# Patient Record
Sex: FEMALE | Race: WHITE | NOT HISPANIC OR LATINO | Employment: OTHER | URBAN - METROPOLITAN AREA
[De-identification: names, ages, dates, MRNs, and addresses within clinical notes are randomized per-mention and may not be internally consistent; named-entity substitution may affect disease eponyms.]

---

## 2018-03-19 ENCOUNTER — THERAPY VISIT (OUTPATIENT)
Dept: PHYSICAL THERAPY | Facility: CLINIC | Age: 68
End: 2018-03-19
Payer: COMMERCIAL

## 2018-03-19 DIAGNOSIS — M25.572 PAIN IN JOINT, ANKLE AND FOOT, LEFT: Primary | ICD-10-CM

## 2018-03-19 PROCEDURE — 97110 THERAPEUTIC EXERCISES: CPT | Mod: GP | Performed by: PHYSICAL THERAPIST

## 2018-03-19 PROCEDURE — 97161 PT EVAL LOW COMPLEX 20 MIN: CPT | Mod: GP | Performed by: PHYSICAL THERAPIST

## 2018-03-19 NOTE — PROGRESS NOTES
[facility] Initial Evaluation  Subjective:  Patient is a 67 year old female presenting with rehab left ankle/foot hpi. The history is provided by the patient. No  was used.   Brenda Christopher is a 67 year old female with a left ankle condition.  Condition occurred with:  A fall/slip.  Condition occurred: in the community.  This is a new condition  Onset of L ankle pain when she slipped on the ice and landed on her L foot in Jan. Was put in a walking boot for a little bit and then was given an ankle brace instead. Sustained fibula fracture and ligament sprain..    Patient reports pain:  Lateral.  Radiates to:  No radiation.  Pain is described as sharp (pronounced pain) and is intermittent and reported as 2/10 and 8/10.  Associated symptoms:  Loss of motion/stiffness, loss of strength and edema. Pain is the same all the time.  Symptoms are exacerbated by ascending stairs, descending stairs, standing and walking (turning over in bed) and relieved by rest and analgesics (using ankle brace, tramadol PRN).  Since onset symptoms are gradually improving.  Special tests:  X-ray (fracture per patient report).      General health as reported by patient is good.  Pertinent medical history includes:  Overweight, diabetes, high blood pressure and sleep disorder/apnea.  Medical allergies: no.  Surgical history: PF release.  Current medications:  High blood pressure medication (metformin, lisinopril).  Current occupation is Retired.        Barriers include:  None as reported by patient.    Red flags:  None as reported by patient.                        Objective:  System    Ankle/Foot Evaluation  ROM:    AROM:    Dorsiflexion: Left:   10  Right:    Plantarflexion: Left:  60    Right:   Inversion: Left:  28++     Right:   Eversion: 38- pain     Right:         Strength:    Dorsiflexion:  Left: 5-/5     Pain:     Plantarflexion: Left: 5-/5    Pain:++     Inversion:Left: 5-/5   Pain:-/+        Eversion:Left: 4+/5   Pain:++                    LIGAMENT TESTING:       Varus Stress (Calc Fib) Left: pos              PALPATION:   Left ankle tenderness present at:  anterior talofibular ligament; posterior talofibular ligament; calcaneofibular ligament and lateral malleolus  Left ankle tenderness not present at:   medial malleolus    EDEMA: Edema ankle: mild general swelling.                                                                  General Evaluation:                              Gait:    Significant findings:  Ambulates with morderately antalgic gait pattern.                                     ROS    Assessment/Plan:    Patient is a 67 year old female with left side ankle complaints.    Patient has the following significant findings with corresponding treatment plan.                Diagnosis 1:  S/P L ankle fracture and sprain  Pain -  hot/cold therapy, manual therapy, self management, education and home program  Decreased ROM/flexibility - manual therapy, therapeutic exercise, therapeutic activity and home program  Decreased strength - therapeutic exercise, therapeutic activities and home program  Impaired balance - neuro re-education, therapeutic activities and home program  Decreased proprioception - neuro re-education, therapeutic activities and home program  Impaired gait - gait training and home program  Impaired muscle performance - neuro re-education and home program  Decreased function - therapeutic activities and home program    Therapy Evaluation Codes:   1) History comprised of:   Personal factors that impact the plan of care:      Past/current experiences.    Comorbidity factors that impact the plan of care are:      Diabetes, High blood pressure, Pain at night/rest and Sleep disorder/apnea.     Medications impacting care: High blood pressure and DM meds.  2) Examination of Body Systems comprised of:   Body structures and functions that impact the plan of care:      Ankle.   Activity  limitations that impact the plan of care are:      Driving, Stairs, Standing, Walking and Sleeping.  3) Clinical presentation characteristics are:   Stable/Uncomplicated.  4) Decision-Making    Low complexity using standardized patient assessment instrument and/or measureable assessment of functional outcome.  Cumulative Therapy Evaluation is: Low complexity.    Previous and current functional limitations:  (See Goal Flow Sheet for this information)    Short term and Long term goals: (See Goal Flow Sheet for this information)     Communication ability:  Patient appears to be able to clearly communicate and understand verbal and written communication and follow directions correctly.  Treatment Explanation - The following has been discussed with the patient:   RX ordered/plan of care  Anticipated outcomes  Possible risks and side effects  This patient would benefit from PT intervention to resume normal activities.   Rehab potential is good.    Frequency:  1 X week, once daily  Duration:  for 6-8 weeks  Discharge Plan:  Achieve all LTG.  Independent in home treatment program.  Reach maximal therapeutic benefit.    Please refer to the daily flowsheet for treatment today, total treatment time and time spent performing 1:1 timed codes.

## 2018-06-05 ENCOUNTER — HOSPITAL ENCOUNTER (OUTPATIENT)
Dept: MAMMOGRAPHY | Facility: CLINIC | Age: 68
Discharge: HOME | End: 2018-06-05

## 2018-06-05 DIAGNOSIS — Z12.31 VISIT FOR SCREENING MAMMOGRAM: ICD-10-CM

## 2018-06-25 PROBLEM — M25.572 PAIN IN JOINT, ANKLE AND FOOT, LEFT: Status: RESOLVED | Noted: 2018-03-19 | Resolved: 2018-06-25

## 2019-10-04 ENCOUNTER — HOSPITAL ENCOUNTER (OUTPATIENT)
Dept: MAMMOGRAPHY | Facility: CLINIC | Age: 69
Discharge: HOME | End: 2019-10-04

## 2019-10-04 DIAGNOSIS — Z12.31 VISIT FOR SCREENING MAMMOGRAM: ICD-10-CM

## 2021-05-24 ENCOUNTER — RECORDS - HEALTHEAST (OUTPATIENT)
Dept: ADMINISTRATIVE | Facility: CLINIC | Age: 71
End: 2021-05-24

## 2021-05-25 ENCOUNTER — RECORDS - HEALTHEAST (OUTPATIENT)
Dept: ADMINISTRATIVE | Facility: CLINIC | Age: 71
End: 2021-05-25

## 2021-05-26 ENCOUNTER — RECORDS - HEALTHEAST (OUTPATIENT)
Dept: ADMINISTRATIVE | Facility: CLINIC | Age: 71
End: 2021-05-26

## 2021-05-27 ENCOUNTER — RECORDS - HEALTHEAST (OUTPATIENT)
Dept: ADMINISTRATIVE | Facility: CLINIC | Age: 71
End: 2021-05-27

## 2021-05-28 ENCOUNTER — RECORDS - HEALTHEAST (OUTPATIENT)
Dept: ADMINISTRATIVE | Facility: CLINIC | Age: 71
End: 2021-05-28

## 2021-05-29 ENCOUNTER — RECORDS - HEALTHEAST (OUTPATIENT)
Dept: ADMINISTRATIVE | Facility: CLINIC | Age: 71
End: 2021-05-29

## 2021-05-30 ENCOUNTER — RECORDS - HEALTHEAST (OUTPATIENT)
Dept: ADMINISTRATIVE | Facility: CLINIC | Age: 71
End: 2021-05-30

## 2021-05-31 ENCOUNTER — RECORDS - HEALTHEAST (OUTPATIENT)
Dept: ADMINISTRATIVE | Facility: CLINIC | Age: 71
End: 2021-05-31

## 2021-06-19 ENCOUNTER — HEALTH MAINTENANCE LETTER (OUTPATIENT)
Age: 71
End: 2021-06-19

## 2021-07-13 ENCOUNTER — RECORDS - HEALTHEAST (OUTPATIENT)
Dept: ADMINISTRATIVE | Facility: CLINIC | Age: 71
End: 2021-07-13

## 2021-07-21 ENCOUNTER — RECORDS - HEALTHEAST (OUTPATIENT)
Dept: ADMINISTRATIVE | Facility: CLINIC | Age: 71
End: 2021-07-21

## 2021-10-09 ENCOUNTER — HEALTH MAINTENANCE LETTER (OUTPATIENT)
Age: 71
End: 2021-10-09

## 2021-10-15 ENCOUNTER — HOSPITAL ENCOUNTER (INPATIENT)
Facility: HOSPITAL | Age: 71
LOS: 2 days | Discharge: HOME | DRG: 391 | End: 2021-10-18
Attending: EMERGENCY MEDICINE | Admitting: STUDENT IN AN ORGANIZED HEALTH CARE EDUCATION/TRAINING PROGRAM
Payer: MEDICARE

## 2021-10-15 ENCOUNTER — APPOINTMENT (OUTPATIENT)
Dept: RADIOLOGY | Facility: HOSPITAL | Age: 71
DRG: 391 | End: 2021-10-15
Attending: EMERGENCY MEDICINE
Payer: MEDICARE

## 2021-10-15 ENCOUNTER — APPOINTMENT (OUTPATIENT)
Dept: CT IMAGING | Facility: HOSPITAL | Age: 71
DRG: 391 | End: 2021-10-15
Attending: EMERGENCY MEDICINE
Payer: MEDICARE

## 2021-10-15 DIAGNOSIS — Z79.4 TYPE 2 DIABETES MELLITUS WITH DIABETIC NEUROPATHY, WITH LONG-TERM CURRENT USE OF INSULIN (H): ICD-10-CM

## 2021-10-15 DIAGNOSIS — R79.89 ELEVATED TROPONIN: Primary | ICD-10-CM

## 2021-10-15 DIAGNOSIS — I21.3 ST ELEVATION MI (STEMI) (H): ICD-10-CM

## 2021-10-15 DIAGNOSIS — I24.9 ACS (ACUTE CORONARY SYNDROME) (H): ICD-10-CM

## 2021-10-15 DIAGNOSIS — I10 ESSENTIAL HYPERTENSION: ICD-10-CM

## 2021-10-15 DIAGNOSIS — I21.4 NSTEMI (NON-ST ELEVATED MYOCARDIAL INFARCTION) (H): ICD-10-CM

## 2021-10-15 DIAGNOSIS — R91.1 PULMONARY NODULE: ICD-10-CM

## 2021-10-15 DIAGNOSIS — E11.40 TYPE 2 DIABETES MELLITUS WITH DIABETIC NEUROPATHY, WITH LONG-TERM CURRENT USE OF INSULIN (H): ICD-10-CM

## 2021-10-15 DIAGNOSIS — K44.9 HIATAL HERNIA: ICD-10-CM

## 2021-10-15 PROBLEM — R10.10 UPPER ABDOMINAL PAIN: Status: ACTIVE | Noted: 2021-10-15

## 2021-10-15 LAB
ALBUMIN SERPL-MCNC: 3.8 G/DL (ref 3.5–5)
ALP SERPL-CCNC: 70 U/L (ref 45–120)
ALT SERPL W P-5'-P-CCNC: 20 U/L (ref 0–45)
ANION GAP SERPL CALCULATED.3IONS-SCNC: 12 MMOL/L (ref 5–18)
AST SERPL W P-5'-P-CCNC: 18 U/L (ref 0–40)
BASOPHILS # BLD AUTO: 0 10E3/UL (ref 0–0.2)
BASOPHILS NFR BLD AUTO: 1 %
BILIRUB DIRECT SERPL-MCNC: 0.1 MG/DL
BILIRUB SERPL-MCNC: 0.4 MG/DL (ref 0–1)
BUN SERPL-MCNC: 13 MG/DL (ref 8–28)
CALCIUM SERPL-MCNC: 9.7 MG/DL (ref 8.5–10.5)
CHLORIDE BLD-SCNC: 102 MMOL/L (ref 98–107)
CO2 SERPL-SCNC: 25 MMOL/L (ref 22–31)
CREAT SERPL-MCNC: 0.79 MG/DL (ref 0.6–1.1)
EOSINOPHIL # BLD AUTO: 0.1 10E3/UL (ref 0–0.7)
EOSINOPHIL NFR BLD AUTO: 1 %
ERYTHROCYTE [DISTWIDTH] IN BLOOD BY AUTOMATED COUNT: 14.1 % (ref 10–15)
ERYTHROCYTE [DISTWIDTH] IN BLOOD BY AUTOMATED COUNT: 14.1 % (ref 10–15)
GFR SERPL CREATININE-BSD FRML MDRD: 76 ML/MIN/1.73M2
GLUCOSE BLD-MCNC: 162 MG/DL (ref 70–125)
HCT VFR BLD AUTO: 34.8 % (ref 35–47)
HCT VFR BLD AUTO: 38.6 % (ref 35–47)
HGB BLD-MCNC: 11.5 G/DL (ref 11.7–15.7)
HGB BLD-MCNC: 12.4 G/DL (ref 11.7–15.7)
HOLD SPECIMEN: NORMAL
IMM GRANULOCYTES # BLD: 0 10E3/UL
IMM GRANULOCYTES NFR BLD: 1 %
LIPASE SERPL-CCNC: 42 U/L (ref 0–52)
LYMPHOCYTES # BLD AUTO: 1.7 10E3/UL (ref 0.8–5.3)
LYMPHOCYTES NFR BLD AUTO: 20 %
MAGNESIUM SERPL-MCNC: 1.5 MG/DL (ref 1.8–2.6)
MCH RBC QN AUTO: 28.7 PG (ref 26.5–33)
MCH RBC QN AUTO: 29.1 PG (ref 26.5–33)
MCHC RBC AUTO-ENTMCNC: 32.1 G/DL (ref 31.5–36.5)
MCHC RBC AUTO-ENTMCNC: 33 G/DL (ref 31.5–36.5)
MCV RBC AUTO: 88 FL (ref 78–100)
MCV RBC AUTO: 89 FL (ref 78–100)
MONOCYTES # BLD AUTO: 0.3 10E3/UL (ref 0–1.3)
MONOCYTES NFR BLD AUTO: 4 %
NEUTROPHILS # BLD AUTO: 6.6 10E3/UL (ref 1.6–8.3)
NEUTROPHILS NFR BLD AUTO: 73 %
NRBC # BLD AUTO: 0 10E3/UL
NRBC BLD AUTO-RTO: 0 /100
PLATELET # BLD AUTO: 300 10E3/UL (ref 150–450)
PLATELET # BLD AUTO: 329 10E3/UL (ref 150–450)
POTASSIUM BLD-SCNC: 4.5 MMOL/L (ref 3.5–5)
PROT SERPL-MCNC: 7.3 G/DL (ref 6–8)
RBC # BLD AUTO: 3.95 10E6/UL (ref 3.8–5.2)
RBC # BLD AUTO: 4.32 10E6/UL (ref 3.8–5.2)
SODIUM SERPL-SCNC: 139 MMOL/L (ref 136–145)
TROPONIN I SERPL-MCNC: 0.09 NG/ML (ref 0–0.29)
TROPONIN I SERPL-MCNC: 0.28 NG/ML (ref 0–0.29)
TROPONIN I SERPL-MCNC: 0.34 NG/ML (ref 0–0.29)
WBC # BLD AUTO: 8.8 10E3/UL (ref 4–11)
WBC # BLD AUTO: 9 10E3/UL (ref 4–11)

## 2021-10-15 PROCEDURE — 93005 ELECTROCARDIOGRAM TRACING: CPT | Performed by: STUDENT IN AN ORGANIZED HEALTH CARE EDUCATION/TRAINING PROGRAM

## 2021-10-15 PROCEDURE — 80053 COMPREHEN METABOLIC PANEL: CPT | Performed by: EMERGENCY MEDICINE

## 2021-10-15 PROCEDURE — 96365 THER/PROPH/DIAG IV INF INIT: CPT | Mod: 59

## 2021-10-15 PROCEDURE — 250N000009 HC RX 250: Performed by: EMERGENCY MEDICINE

## 2021-10-15 PROCEDURE — G0378 HOSPITAL OBSERVATION PER HR: HCPCS

## 2021-10-15 PROCEDURE — 36415 COLL VENOUS BLD VENIPUNCTURE: CPT | Performed by: STUDENT IN AN ORGANIZED HEALTH CARE EDUCATION/TRAINING PROGRAM

## 2021-10-15 PROCEDURE — 83690 ASSAY OF LIPASE: CPT | Performed by: STUDENT IN AN ORGANIZED HEALTH CARE EDUCATION/TRAINING PROGRAM

## 2021-10-15 PROCEDURE — 99219 PR INITIAL OBSERVATION CARE,LEVEL II: CPT | Performed by: STUDENT IN AN ORGANIZED HEALTH CARE EDUCATION/TRAINING PROGRAM

## 2021-10-15 PROCEDURE — 93005 ELECTROCARDIOGRAM TRACING: CPT | Performed by: EMERGENCY MEDICINE

## 2021-10-15 PROCEDURE — 250N000011 HC RX IP 250 OP 636: Performed by: EMERGENCY MEDICINE

## 2021-10-15 PROCEDURE — 71046 X-RAY EXAM CHEST 2 VIEWS: CPT

## 2021-10-15 PROCEDURE — 84484 ASSAY OF TROPONIN QUANT: CPT | Performed by: EMERGENCY MEDICINE

## 2021-10-15 PROCEDURE — 83690 ASSAY OF LIPASE: CPT | Performed by: EMERGENCY MEDICINE

## 2021-10-15 PROCEDURE — 85027 COMPLETE CBC AUTOMATED: CPT | Performed by: EMERGENCY MEDICINE

## 2021-10-15 PROCEDURE — 36415 COLL VENOUS BLD VENIPUNCTURE: CPT | Performed by: EMERGENCY MEDICINE

## 2021-10-15 PROCEDURE — C9803 HOPD COVID-19 SPEC COLLECT: HCPCS

## 2021-10-15 PROCEDURE — 85025 COMPLETE CBC W/AUTO DIFF WBC: CPT | Performed by: EMERGENCY MEDICINE

## 2021-10-15 PROCEDURE — 83735 ASSAY OF MAGNESIUM: CPT | Performed by: STUDENT IN AN ORGANIZED HEALTH CARE EDUCATION/TRAINING PROGRAM

## 2021-10-15 PROCEDURE — 99285 EMERGENCY DEPT VISIT HI MDM: CPT | Mod: 25

## 2021-10-15 PROCEDURE — 84484 ASSAY OF TROPONIN QUANT: CPT | Performed by: STUDENT IN AN ORGANIZED HEALTH CARE EDUCATION/TRAINING PROGRAM

## 2021-10-15 PROCEDURE — 85025 COMPLETE CBC W/AUTO DIFF WBC: CPT | Performed by: STUDENT IN AN ORGANIZED HEALTH CARE EDUCATION/TRAINING PROGRAM

## 2021-10-15 PROCEDURE — 83036 HEMOGLOBIN GLYCOSYLATED A1C: CPT | Performed by: STUDENT IN AN ORGANIZED HEALTH CARE EDUCATION/TRAINING PROGRAM

## 2021-10-15 PROCEDURE — 71260 CT THORAX DX C+: CPT

## 2021-10-15 PROCEDURE — 250N000013 HC RX MED GY IP 250 OP 250 PS 637: Performed by: EMERGENCY MEDICINE

## 2021-10-15 PROCEDURE — 96366 THER/PROPH/DIAG IV INF ADDON: CPT

## 2021-10-15 PROCEDURE — 80053 COMPREHEN METABOLIC PANEL: CPT | Performed by: STUDENT IN AN ORGANIZED HEALTH CARE EDUCATION/TRAINING PROGRAM

## 2021-10-15 RX ORDER — HEPARIN SODIUM 10000 [USP'U]/100ML
0-5000 INJECTION, SOLUTION INTRAVENOUS CONTINUOUS
Status: DISCONTINUED | OUTPATIENT
Start: 2021-10-15 | End: 2021-10-17

## 2021-10-15 RX ORDER — GLIPIZIDE 10 MG/1
20 TABLET, FILM COATED, EXTENDED RELEASE ORAL DAILY
COMMUNITY

## 2021-10-15 RX ORDER — CETIRIZINE HYDROCHLORIDE 10 MG/1
10 TABLET ORAL DAILY
COMMUNITY

## 2021-10-15 RX ORDER — SERTRALINE HYDROCHLORIDE 100 MG/1
200 TABLET, FILM COATED ORAL DAILY
COMMUNITY

## 2021-10-15 RX ORDER — IOPAMIDOL 755 MG/ML
100 INJECTION, SOLUTION INTRAVASCULAR ONCE
Status: COMPLETED | OUTPATIENT
Start: 2021-10-15 | End: 2021-10-15

## 2021-10-15 RX ORDER — ONDANSETRON 4 MG/1
4 TABLET, ORALLY DISINTEGRATING ORAL ONCE
Status: COMPLETED | OUTPATIENT
Start: 2021-10-15 | End: 2021-10-15

## 2021-10-15 RX ORDER — CYCLOBENZAPRINE HCL 10 MG
10 TABLET ORAL 3 TIMES DAILY PRN
COMMUNITY

## 2021-10-15 RX ORDER — CLONAZEPAM 1 MG/1
1 TABLET ORAL 2 TIMES DAILY PRN
COMMUNITY

## 2021-10-15 RX ORDER — BUSPIRONE HYDROCHLORIDE 7.5 MG/1
7.5 TABLET ORAL 2 TIMES DAILY
COMMUNITY

## 2021-10-15 RX ORDER — FAMOTIDINE 20 MG/1
20 TABLET, FILM COATED ORAL DAILY
COMMUNITY

## 2021-10-15 RX ORDER — ASPIRIN 81 MG/1
162 TABLET, CHEWABLE ORAL ONCE
Status: COMPLETED | OUTPATIENT
Start: 2021-10-15 | End: 2021-10-15

## 2021-10-15 RX ORDER — ASPIRIN 81 MG/1
81 TABLET ORAL DAILY
COMMUNITY

## 2021-10-15 RX ORDER — LISINOPRIL 5 MG/1
5 TABLET ORAL DAILY
Status: ON HOLD | COMMUNITY
End: 2021-10-18

## 2021-10-15 RX ORDER — ATORVASTATIN CALCIUM 20 MG/1
20 TABLET, FILM COATED ORAL AT BEDTIME
Status: ON HOLD | COMMUNITY
End: 2021-10-18

## 2021-10-15 RX ORDER — TRAZODONE HYDROCHLORIDE 50 MG/1
50 TABLET, FILM COATED ORAL AT BEDTIME
COMMUNITY

## 2021-10-15 RX ORDER — ALBUTEROL SULFATE 90 UG/1
2 AEROSOL, METERED RESPIRATORY (INHALATION) EVERY 6 HOURS PRN
COMMUNITY

## 2021-10-15 RX ORDER — FLUTICASONE PROPIONATE 50 MCG
1 SPRAY, SUSPENSION (ML) NASAL DAILY PRN
COMMUNITY

## 2021-10-15 RX ADMIN — ASPIRIN 81 MG CHEWABLE TABLET 162 MG: 81 TABLET CHEWABLE at 22:35

## 2021-10-15 RX ADMIN — LIDOCAINE HYDROCHLORIDE 30 ML: 20 SOLUTION ORAL; TOPICAL at 19:46

## 2021-10-15 RX ADMIN — IOPAMIDOL 100 ML: 755 INJECTION, SOLUTION INTRAVENOUS at 18:42

## 2021-10-15 RX ADMIN — HEPARIN SODIUM 1200 UNITS/HR: 10000 INJECTION, SOLUTION INTRAVENOUS at 22:57

## 2021-10-15 RX ADMIN — ONDANSETRON 4 MG: 4 TABLET, ORALLY DISINTEGRATING ORAL at 19:47

## 2021-10-15 ASSESSMENT — ENCOUNTER SYMPTOMS
DIARRHEA: 0
FEVER: 0
COUGH: 0
SHORTNESS OF BREATH: 0
VOMITING: 1
NAUSEA: 1
ABDOMINAL PAIN: 1

## 2021-10-15 NOTE — ED PROVIDER NOTES
EMERGENCY DEPARTMENT ENCOUNTER      NAME: Brenda Christopher  AGE: 71 year old female  YOB: 1950  MRN: [id number]  EVALUATION DATE & TIME: No admission date for patient encounter.    PCP: No Ref-Primary, Physician    ED PROVIDER: Roro Gomez M.D.        Chief Complaint   Patient presents with     Abdominal Discomfort         FINAL IMPRESSION:    No diagnosis found.        MEDICAL DECISION MAKIN year old female who presents emergency department with abdominal pain and feeling like heartburn.  History of hiatal hernia.  Multiple episodes of vomiting and this helped improve her pain.  Patient seen in triage and plan to do blood work, CT imaging, and patient to be seen in the main ER when bed available.  Rest the patient ER visit to be managed by one of my partners in the main ED.        ED COURSE:  5:23 PM  I met with the patient in triage to gather history and perform my exam. ED course and treatment discussed.    Basic evaluation done in triage and initial orders placed.  Patient to be seen and managed by my partner for the duration of her ER visit.  No concerns for catastrophic abdominal etiology such as AAA, aortic dissection, or bowel ischemia.      COVID-19 PPE worn during patient evaluation:  Mask: n95 and homemade masks   Eye Protection: goggles   Gown: no  Hair cover: yes  Face shield: no  Patient wearing a mask: yes          CONDITION:  stable        =================================================================  =================================================================    HPI    Patient information was obtained from: Patient    Use of Intrepreter: N/A      Brenda Christopher is a 71 year old female with history of hiatal hernia, GERD who presents to the ER with complaints of abdominal pain.     Patient presents with gradually worsening mid abdominal pain spreading to her epigastric region that started after eating pancakes with syrup this morning. Shortly after  eating, she developed a burning epigastric pain that caused her to feel nauseous. She vomited several times and reports this did relieve the pain somewhat. She notes that she has had acid reflux after eating maple syrup in the past. She takes Famotidine daily before breakfast but didn't take it until after breakfast today. In ED, the nausea has subsided but she continues to feel epigastric pain similar to her previous episodes of GERD. She has a known hiatal hernia but her abdominal pain today felt more severe than previous pain from her hernia.    Denies fever, chest pain, shortness of breath, cough, urinary symptoms, diarrhea, or any other complaints at this time.       REVIEW OF SYSTEMS  Review of Systems   Constitutional: Negative for fever.   Respiratory: Negative for cough and shortness of breath.    Cardiovascular: Negative for chest pain.   Gastrointestinal: Positive for abdominal pain, nausea and vomiting. Negative for diarrhea.   Allergic/Immunologic: Negative for immunocompromised state.   All other systems reviewed and are negative.        PAST MEDICAL HISTORY:  History reviewed. No pertinent past medical history.      PAST SURGICAL HISTORY:  Past Surgical History:   Procedure Laterality Date     BIOPSY BREAST Right 2007     BREAST EXCISIONAL BIOPSY Right 2007     IR LUMBAR EPIDURAL STEROID INJECTION  12/12/2003     IR LUMBAR EPIDURAL STEROID INJECTION  5/21/2004     IR LUMBAR EPIDURAL STEROID INJECTION  6/5/2006     IR LUMBAR EPIDURAL STEROID INJECTION  6/21/2006     IR LUMBAR EPIDURAL STEROID INJECTION  8/15/2008         CURRENT MEDICATIONS:    Prior to Admission medications    Medication Sig Start Date End Date Taking? Authorizing Provider   traMADol (ULTRAM) 50 mg tablet [TRAMADOL (ULTRAM) 50 MG TABLET] Take 1 tablet (50 mg total) by mouth every 6 (six) hours as needed for pain. 9/20/17   Andrae Shirley MD         ALLERGIES:  No Known Allergies      FAMILY HISTORY:  Family History   Problem  Relation Age of Onset     Lung Cancer Father      Lung Cancer Brother      Breast Cancer No family hx of          SOCIAL HISTORY:  Social History     Socioeconomic History     Marital status:      Spouse name: Not on file     Number of children: Not on file     Years of education: Not on file     Highest education level: Not on file   Occupational History     Not on file   Tobacco Use     Smoking status: Not on file   Substance and Sexual Activity     Alcohol use: Not on file     Drug use: Not on file     Sexual activity: Not on file   Other Topics Concern     Not on file   Social History Narrative     Not on file     Social Determinants of Health     Financial Resource Strain:      Difficulty of Paying Living Expenses:    Food Insecurity:      Worried About Running Out of Food in the Last Year:      Ran Out of Food in the Last Year:    Transportation Needs:      Lack of Transportation (Medical):      Lack of Transportation (Non-Medical):    Physical Activity:      Days of Exercise per Week:      Minutes of Exercise per Session:    Stress:      Feeling of Stress :    Social Connections:      Frequency of Communication with Friends and Family:      Frequency of Social Gatherings with Friends and Family:      Attends Confucianist Services:      Active Member of Clubs or Organizations:      Attends Club or Organization Meetings:      Marital Status:    Intimate Partner Violence:      Fear of Current or Ex-Partner:      Emotionally Abused:      Physically Abused:      Sexually Abused:          VITALS:  Patient Vitals for the past 24 hrs:   BP Temp Temp src Pulse Resp SpO2 Weight   10/15/21 1706 (!) 165/74 98  F (36.7  C) Temporal 96 18 97 % --   10/15/21 1613 (!) 170/79 -- -- 98 16 98 % 111.1 kg (245 lb)       Wt Readings from Last 3 Encounters:   10/15/21 111.1 kg (245 lb)         PHYSICAL EXAM    Constitutional:  Well developed, Well nourished, NAD, GCS 15  HENT:  Normocephalic, Atraumatic, Bilateral external ears  normal,  Nose normal. Neck-  Normal range of motion, No tenderness, Supple, No stridor.   Eyes:  PERRL, EOMI, Conjunctiva normal, No discharge.  Respiratory:  Normal breath sounds, No respiratory distress, No wheezing, Speaks full sentences easily. No cough.   Cardiovascular:  Normal heart rate, Regular rhythm, No murmurs, No rubs, No gallops.   GI:  No excessive obesity.  Bowel sounds normal, Soft, slight periumbilical tenderness, No masses, No flank tenderness. No rebound or guarding.       I, Kaitlin Moreno, am serving as a scribe to document services personally performed by Dr. Roro Gomez based on my observation and the provider's statements to me. I, Dr. Roro Gomez MD attest that Kaitlin Moreno is acting in a scribe capacity, has observed my performance of the services and has documented them in accordance with my direction.        Roro Gomez M.D. [facility]  Emergency Medicine and Medical Toxicology  Formerly [facility] EMERGENCY DEPARTMENT  [facility] [facility] [address]  [phone]  Dept: [phone]           Roro Gomez MD  10/15/21 8954

## 2021-10-15 NOTE — ED PROVIDER NOTES
Emergency Department Encounter     Evaluation Date & Time:   No admission date for patient encounter.    CHIEF COMPLAINT:  Abdominal Discomfort      Triage Note:Pt comes in with family. Pt has what she describes as a large hiatal hernia. Pt had some syrup today that triggered her to have upset stomach and what she says is GERD. Pt states that the burning comes from the bottom of her stomach where the hernia is and up to her esophagus. Pt states that this has happened in the past with different foods but never quite this bad. Pt states that symptoms are going away but her stomach does feel a little uneasy. Pt called clinic earlier and they told her to come in just in case it was a heart problem. Pt denies CP.          Impression and Plan       FINAL IMPRESSION:    ICD-10-CM    1. NSTEMI (non-ST elevated myocardial infarction) (H)  I21.4    2. Pulmonary nodule  R91.1          ED COURSE & MEDICAL DECISION MAKIN year old female, history of DM, HTN, HLD and obesity, who presents for evaluation of burning epigastric abdominal abdominal pain that started ~5 hours ago after eating sugar-free syrup.  She developed nausea and proceeded to have 5 episodes of vomiting, which significantly improved her pain.  She denies any ongoing pain currently.  No associated diarrhea, urinary symptoms or fevers.    On exam abdomen is soft and non-tender to palpation.    Patient denies chest pain or shortness of breath, however cardiac etiology considered for which EKG was performed and demonstrated NSR with no ischemic changes.  Troponin 0.09.    Labs otherwise remarkable for no leukocytosis, anemia, significant electrolyte derangements or renal impairment.  No laboratory evidence of hepatitis, biliary obstruction or pancreatitis.    CT abdomen / pelvis performed and demonstrated a moderate hiatal hernia and a 0.5 cm pulmonary nodule in the lingula.    Given higher than expected (although normal) troponin, decision made to perform  a 3-hour repeat EKG and delta troponin.    Repeat EKG with sinus rhythm and first-degree AV block with no ischemic changes.  Delta troponin upper limits of normal at 0.28.  The lab had initially lost her repeat lab draw, thus RN mervin another which was elevated further at 0.34.    CXR performed and demonstrated normal heart size and pulmonary vascularity with large esophageal hiatal hernia. The lungs are clear.    After review of the chest x-ray and discussion with the patient to rule out contraindications to anticoagulation, a heparin drip was initiated.    Patient admitted to hospitalist service for further cardiac evaluation.  Unfortunately there are no cardiac telemetry beds available at this time, thus patient will need to board in the ED.  Patient hemodynamically stable thus far in ED course.      7:00 PM I met with the patient for the initial interview and physical examination. Discussed plan for treatment and workup in the ED.        At the conclusion of the encounter I discussed the results of all the tests and the disposition. The questions were answered. The patient or family acknowledged understanding and was agreeable with the care plan.  PPE: Provider wore gloves, N95 mask, eye protection, surgical cap, and paper mask.     31 minutes of critical care time    Reassessments & Consults       MEDICATIONS GIVEN IN THE EMERGENCY DEPARTMENT:  Medications   heparin 25,000 units in 0.45% NaCl 250 mL ANTICOAGULANT infusion (1,200 Units/hr Intravenous Rate/Dose Verify 10/16/21 0704)   melatonin tablet 1 mg (has no administration in time range)   ondansetron (ZOFRAN-ODT) ODT tab 4 mg (has no administration in time range)     Or   ondansetron (ZOFRAN) injection 4 mg (has no administration in time range)   pantoprazole (PROTONIX) IV push injection 40 mg (40 mg Intravenous Given 10/16/21 8884)   albuterol (PROAIR HFA/PROVENTIL HFA/VENTOLIN HFA) 108 (90 Base) MCG/ACT inhaler 2 puff (has no administration in time range)    aspirin EC tablet 81 mg (81 mg Oral Given 10/16/21 0759)   atorvastatin (LIPITOR) tablet 20 mg (20 mg Oral Given 10/16/21 0132)   clonazePAM (klonoPIN) tablet 1 mg (has no administration in time range)   fluticasone (FLONASE) 50 MCG/ACT spray 1 spray (has no administration in time range)   lisinopril (ZESTRIL) tablet 5 mg (5 mg Oral Given 10/16/21 0759)   sertraline (ZOLOFT) tablet 200 mg (200 mg Oral Given 10/16/21 0759)   traZODone (DESYREL) tablet 50 mg (50 mg Oral Given 10/16/21 0132)   glucose gel 15-30 g (has no administration in time range)     Or   dextrose 50 % injection 25-50 mL (has no administration in time range)     Or   glucagon injection 1 mg (has no administration in time range)   insulin glargine (LANTUS PEN) injection 15 Units (15 Units Subcutaneous Given 10/16/21 0132)   insulin aspart (NovoLOG) injection (RAPID ACTING) (1 Units Subcutaneous Not Given 10/16/21 0704)   busPIRone (BUSPAR) tablet 7.5 mg (7.5 mg Oral Given 10/16/21 0759)   lidocaine (XYLOCAINE) 2 % 15 mL, alum & mag hydroxide-simethicone (MAALOX) 15 mL GI Cocktail (30 mLs Oral Given 10/15/21 1946)   ondansetron (ZOFRAN-ODT) ODT tab 4 mg (4 mg Oral Given 10/15/21 1947)   iopamidol (ISOVUE-370) solution 100 mL (100 mLs Intravenous Given 10/15/21 1842)   aspirin (ASA) chewable tablet 162 mg (162 mg Oral Given 10/15/21 2235)       NEW PRESCRIPTIONS STARTED AT TODAY'S ED VISIT:  New Prescriptions    No medications on file       HPI     HPI     Brenda Crhistopher is a 71 year old female with a pertinent history of hypertension, hyperlipidemia, type II diabetes mellitus, major depressive disorder, anxiety, who presents to this ED via walk-in for evaluation of abdominal discomfort.     Patient reports onset of burning epigastric abdominal pain at ~1400 today after eating pancakes with a sugar-free syrup at ~1000. She then vomited ~5 times, which she notes significantly improved the epigastric burning pain.  No hematemesis.  She reports that  lying down did provoke her pain and sitting up did relieve her symptoms. Patient denies any ongoing abdominal pain.     She otherwise denies chest pain, shortness of breath, diarrhea, cough, fever or other concerns.      She endorses taking medications for type II diabetes mellitus, hypertension, and high cholesterol. Denies any history of abdominal surgeries.       REVIEW OF SYSTEMS:  All other systems reviewed and are negative.      Medical History     History reviewed. No pertinent past medical history.    Past Surgical History:   Procedure Laterality Date     BIOPSY BREAST Right 2007     BREAST EXCISIONAL BIOPSY Right 2007     IR LUMBAR EPIDURAL STEROID INJECTION  12/12/2003     IR LUMBAR EPIDURAL STEROID INJECTION  5/21/2004     IR LUMBAR EPIDURAL STEROID INJECTION  6/5/2006     IR LUMBAR EPIDURAL STEROID INJECTION  6/21/2006     IR LUMBAR EPIDURAL STEROID INJECTION  8/15/2008       Family History   Problem Relation Age of Onset     Lung Cancer Father      Lung Cancer Brother      Breast Cancer No family hx of        Social History     Tobacco Use     Smoking status: None   Substance Use Topics     Alcohol use: None     Drug use: None       albuterol (PROAIR HFA/PROVENTIL HFA/VENTOLIN HFA) 108 (90 Base) MCG/ACT inhaler  aspirin 81 MG EC tablet  atorvastatin (LIPITOR) 20 MG tablet  busPIRone (BUSPAR) 7.5 MG tablet  cetirizine (ZYRTEC) 10 MG tablet  clonazePAM (KLONOPIN) 1 MG tablet  cyclobenzaprine (FLEXERIL) 10 MG tablet  famotidine (PEPCID) 20 MG tablet  fluticasone (FLONASE) 50 MCG/ACT nasal spray  glipiZIDE (GLUCOTROL XL) 10 MG 24 hr tablet  insulin glargine (LANTUS PEN) 100 UNIT/ML pen  lisinopril (ZESTRIL) 5 MG tablet  metFORMIN (GLUCOPHAGE) 500 MG tablet  sertraline (ZOLOFT) 100 MG tablet  traZODone (DESYREL) 50 MG tablet        Physical Exam     First Vitals:  Patient Vitals for the past 24 hrs:   BP Temp Temp src Pulse Resp SpO2 Weight   10/16/21 0759 (!) 133/94 -- -- -- -- -- --   10/16/21 0700 -- --  -- 91 19 98 % --   10/16/21 0415 -- -- -- 86 24 99 % --   10/16/21 0145 129/61 -- -- 74 19 97 % --   10/15/21 2330 130/76 -- -- 102 28 95 % --   10/15/21 2315 130/67 -- -- 95 15 96 % --   10/15/21 2300 (!) 140/72 -- -- 95 21 96 % --   10/15/21 2245 135/62 -- -- 96 15 97 % --   10/15/21 2230 127/59 -- -- 94 28 98 % --   10/15/21 2215 (!) 144/67 -- -- 95 13 99 % --   10/15/21 2200 (!) 168/81 -- -- 102 -- 98 % 111.1 kg (245 lb)   10/15/21 2145 126/65 -- -- 94 17 98 % --   10/15/21 2130 124/64 -- -- 96 10 98 % --   10/15/21 2115 127/66 -- -- 99 -- 97 % --   10/15/21 2100 125/62 -- -- 99 -- 97 % --   10/15/21 2045 137/61 -- -- -- -- -- --   10/15/21 1706 (!) 165/74 98  F (36.7  C) Temporal 96 18 97 % --   10/15/21 1613 (!) 170/79 -- -- 98 16 98 % 111.1 kg (245 lb)       PHYSICAL EXAM:   Physical Exam    GENERAL: Awake, alert.  In no acute distress.   HEENT: Normocephalic, atraumatic. Pupils equal, round and reactive. Conjunctiva normal.   NECK: No stridor.  PULMONARY: Symmetrical breath sounds without distress.  Lungs clear to auscultation bilaterally without wheezes, rhonchi or rales.  CARDIO: Borderline tachycardic rate with regular rhythm.  No significant murmur, rub or gallop.  Radial pulses strong and symmetrical.  ABDOMINAL: Abdomen soft, non-distended and non-tender to palpation.   EXTREMITIES: No lower extremity swelling or edema.      NEURO: Alert and oriented to person, place and time.  Cranial nerves grossly intact.  No focal motor deficit.  PSYCH: Normal mood and affect.  SKIN: No rashes.     Results     LAB:  All pertinent labs reviewed and interpreted  Labs Ordered and Resulted from Time of ED Arrival Up to the Time of Departure from the ED   BASIC METABOLIC PANEL - Abnormal; Notable for the following components:       Result Value    Glucose 162 (*)     All other components within normal limits   TROPONIN I - Abnormal; Notable for the following components:    Troponin I 0.34 (*)     All other components  within normal limits   MAGNESIUM - Abnormal; Notable for the following components:    Magnesium 1.5 (*)     All other components within normal limits   CBC WITH PLATELETS - Abnormal; Notable for the following components:    Hemoglobin 11.5 (*)     Hematocrit 34.8 (*)     All other components within normal limits   HEMOGLOBIN A1C - Abnormal; Notable for the following components:    Hemoglobin A1C 8.6 (*)     All other components within normal limits   GLUCOSE BY METER - Abnormal; Notable for the following components:    GLUCOSE BY METER POCT 102 (*)     All other components within normal limits   HEPATIC FUNCTION PANEL - Normal   LIPASE - Normal   TROPONIN I - Normal   TROPONIN I - Normal   COVID-19 VIRUS (CORONAVIRUS) BY PCR - Normal    Narrative:     Testing was performed using the felipe  SARS-CoV-2 & Influenza A/B Assay on the felipe  [employer]  System.  This test should be ordered for the detection of SARS-COV-2 in individuals who meet SARS-CoV-2 clinical and/or epidemiological criteria. Test performance is unknown in asymptomatic patients.  This test is for in vitro diagnostic use under the FDA EUA for laboratories certified under CLIA to perform moderate and/or high complexity testing. This test has not been FDA cleared or approved.  A negative test does not rule out the presence of PCR inhibitors in the specimen or target RNA in concentration below the limit of detection for the assay. The possibility of a false negative should be considered if the patient's recent exposure or clinical presentation suggests COVID-19.  [facility] are certified under the Clinical Laboratory Improvement Amendments of 1988 (CLIA-88) as qualified to perform moderate and/or high complexity laboratory testing.   CBC WITH PLATELETS AND DIFFERENTIAL   EXTRA RED TOP TUBE   HEPARIN UNFRACTIONATED ANTI XA LEVEL   COMPREHENSIVE METABOLIC PANEL   TROPONIN I   GLUCOSE MONITOR NURSING POCT   GLUCOSE MONITOR NURSING POCT    TROPONIN I   ASSIGN ATTENDING PROVIDER   CALL   MEASURE WEIGHT   NOTIFY PHYSICIAN   NOTIFY PHYSICIAN   OBSERVATION GOALS   ASSESS   NOTIFY PHYSICIAN   MEASURE HEIGHT AND WEIGHT   VITAL SIGNS   NOTIFY   NOTIFY   ACTIVITY   IP CARBOHYDRATE COUNTING BY NURSING   PATIENT EDUCATION   ASSESS FOR HYPOGLYCEMIA SYMPTOMS   NOTIFY PHYSICIAN   CBC WITH PLATELETS & DIFFERENTIAL    Narrative:     The following orders were created for panel order CBC with Platelets & Differential.  Procedure                               Abnormality         Status                     ---------                               -----------         ------                     CBC with platelets and d...[[id number]]                      Final result                 Please view results for these tests on the individual orders.   EXTRA TUBE    Narrative:     The following orders were created for panel order [place] Draw.  Procedure                               Abnormality         Status                     ---------                               -----------         ------                     Extra Red Top Tube[[id number]]                               Final result                 Please view results for these tests on the individual orders.   CBC WITH PLATELETS & DIFFERENTIAL       RADIOLOGY:  Chest XR,  PA & LAT   Final Result   IMPRESSION: Heart size and pulmonary vascularity normal. Large esophageal hiatal hernia. The lungs are clear.      CT Chest/Abdomen/Pelvis w Contrast   Final Result   IMPRESSION:   1.  Moderate hiatal hernia.   2.  A 0.5 cm pulmonary nodule in the lingula. See guidelines below.      REFERENCE:   Guidelines for Management of Incidental Pulmonary Nodules Detected on CT Images: From the Fleischner Society 2017.    Guidelines apply to incidental nodules in patients who are 35 years or older.   Guidelines do not apply to lung cancer screening, patients with immunosuppression, or patients with known primary cancer.      SINGLE NODULE    Nodule size <6 mm   Low-risk patients: No follow-up needed.   High-risk patients: Optional follow-up at 12 months.         Echocardiogram Complete    (Results Pending)     ECG:  10/15/2021, 17:11; NSR with rate of 89 bpm; normal intervals; normal conduction; no ST-T wave changes consistent with ACS or pericarditis; compared to previous EKG dated 2/10/2003, the rate has decreased by 17 bpm, otherwise no significant changes    EKG independently reviewed and interpreted by Alana Hercules MD    10/15/2021, 22:37; sinus rhythm with rate of 94 bpm; first-degree AV block; no ST-T wave changes consistent with ACS or pericarditis; compared to previous EKG dated 10/15/2021, the NV interval has lengthened    EKG independently reviewed and interpreted by Alana Hercules MD      PROCEDURES:  Procedures:    Critical Care     Performed by: Alana Hercules    Authorized by: Alana Hercules  Total critical care time: 31 minutes  Critical care was necessary to treat or prevent imminent or life-threatening deterioration of the following conditions: Acute coronary Syndrome  Critical care was time spent personally by me on the following activities: development of treatment plan with patient or surrogate, examination of patient, evaluation of patient's response to treatment, obtaining history from patient or surrogate, ordering and performing treatments and interventions, ordering and review of laboratory studies, ordering and review of radiographic studies, re-evaluation of patient's condition and monitoring for potential decompensation.  Critical care time was exclusive of separately billable procedures and treating other patients.      I, Maria Guadalupe Dallas, am serving as a scribe to document services personally performed by Alana Hercules MD based on my observation and the provider's statements to me. I, Alana Hercules MD attest that Maria Guadalupe Dallas is acting in a scribe capacity, has observed my performance of the services and has documented  them in accordance with my direction.    Alana Hercules MD  Emergency Medicine  [facility] EMERGENCY DEPARTMENT          Alana Hercules MD  10/16/21 0808

## 2021-10-15 NOTE — ED TRIAGE NOTES
Pt comes in with family. Pt has what she describes as a large hiatal hernia. Pt had some syrup today that triggered her to have upset stomach and what she says is GERD. Pt states that the burning comes from the bottom of her stomach where the hernia is and up to her esophagus. Pt states that this has happened in the past with different foods but never quite this bad. Pt states that symptoms are going away but her stomach does feel a little uneasy. Pt called clinic earlier and they told her to come in just in case it was a heart problem. Pt denies CP.

## 2021-10-16 ENCOUNTER — APPOINTMENT (OUTPATIENT)
Dept: CARDIOLOGY | Facility: HOSPITAL | Age: 71
DRG: 391 | End: 2021-10-16
Attending: STUDENT IN AN ORGANIZED HEALTH CARE EDUCATION/TRAINING PROGRAM
Payer: MEDICARE

## 2021-10-16 PROBLEM — I21.4 NSTEMI (NON-ST ELEVATED MYOCARDIAL INFARCTION) (H): Status: ACTIVE | Noted: 2021-10-16

## 2021-10-16 LAB
ALBUMIN SERPL-MCNC: 3.5 G/DL (ref 3.5–5)
ALP SERPL-CCNC: 64 U/L (ref 45–120)
ALT SERPL W P-5'-P-CCNC: 20 U/L (ref 0–45)
ANION GAP SERPL CALCULATED.3IONS-SCNC: 12 MMOL/L (ref 5–18)
AST SERPL W P-5'-P-CCNC: 18 U/L (ref 0–40)
ATRIAL RATE - MUSE: 89 BPM
BASOPHILS # BLD AUTO: 0.1 10E3/UL (ref 0–0.2)
BASOPHILS NFR BLD AUTO: 1 %
BILIRUB SERPL-MCNC: 0.5 MG/DL (ref 0–1)
BUN SERPL-MCNC: 11 MG/DL (ref 8–28)
CALCIUM SERPL-MCNC: 9.5 MG/DL (ref 8.5–10.5)
CHLORIDE BLD-SCNC: 102 MMOL/L (ref 98–107)
CO2 SERPL-SCNC: 25 MMOL/L (ref 22–31)
CREAT SERPL-MCNC: 0.85 MG/DL (ref 0.6–1.1)
DIASTOLIC BLOOD PRESSURE - MUSE: NORMAL MMHG
EOSINOPHIL # BLD AUTO: 0.1 10E3/UL (ref 0–0.7)
EOSINOPHIL NFR BLD AUTO: 2 %
ERYTHROCYTE [DISTWIDTH] IN BLOOD BY AUTOMATED COUNT: 14.4 % (ref 10–15)
GFR SERPL CREATININE-BSD FRML MDRD: 70 ML/MIN/1.73M2
GLUCOSE BLD-MCNC: 168 MG/DL (ref 70–125)
GLUCOSE BLDC GLUCOMTR-MCNC: 102 MG/DL (ref 70–99)
GLUCOSE BLDC GLUCOMTR-MCNC: 143 MG/DL (ref 70–99)
GLUCOSE BLDC GLUCOMTR-MCNC: 165 MG/DL (ref 70–99)
GLUCOSE BLDC GLUCOMTR-MCNC: 189 MG/DL (ref 70–99)
HBA1C MFR BLD: 8.6 %
HCT VFR BLD AUTO: 36 % (ref 35–47)
HGB BLD-MCNC: 11.5 G/DL (ref 11.7–15.7)
IMM GRANULOCYTES # BLD: 0 10E3/UL
IMM GRANULOCYTES NFR BLD: 0 %
INTERPRETATION ECG - MUSE: NORMAL
LVEF ECHO: NORMAL
LYMPHOCYTES # BLD AUTO: 2.1 10E3/UL (ref 0.8–5.3)
LYMPHOCYTES NFR BLD AUTO: 26 %
MCH RBC QN AUTO: 28.9 PG (ref 26.5–33)
MCHC RBC AUTO-ENTMCNC: 31.9 G/DL (ref 31.5–36.5)
MCV RBC AUTO: 91 FL (ref 78–100)
MONOCYTES # BLD AUTO: 0.5 10E3/UL (ref 0–1.3)
MONOCYTES NFR BLD AUTO: 6 %
NEUTROPHILS # BLD AUTO: 5.2 10E3/UL (ref 1.6–8.3)
NEUTROPHILS NFR BLD AUTO: 65 %
NRBC # BLD AUTO: 0 10E3/UL
NRBC BLD AUTO-RTO: 0 /100
P AXIS - MUSE: -1 DEGREES
PLATELET # BLD AUTO: 302 10E3/UL (ref 150–450)
POTASSIUM BLD-SCNC: 4.1 MMOL/L (ref 3.5–5)
PR INTERVAL - MUSE: 158 MS
PROT SERPL-MCNC: 6.9 G/DL (ref 6–8)
QRS DURATION - MUSE: 78 MS
QT - MUSE: 366 MS
QTC - MUSE: 445 MS
R AXIS - MUSE: -1 DEGREES
RBC # BLD AUTO: 3.98 10E6/UL (ref 3.8–5.2)
SARS-COV-2 RNA RESP QL NAA+PROBE: NEGATIVE
SODIUM SERPL-SCNC: 139 MMOL/L (ref 136–145)
SYSTOLIC BLOOD PRESSURE - MUSE: NORMAL MMHG
T AXIS - MUSE: 51 DEGREES
TROPONIN I SERPL-MCNC: 0.16 NG/ML (ref 0–0.29)
TROPONIN I SERPL-MCNC: 0.16 NG/ML (ref 0–0.29)
UFH PPP CHRO-ACNC: 0.11 IU/ML
UFH PPP CHRO-ACNC: 0.81 IU/ML
VENTRICULAR RATE- MUSE: 89 BPM
WBC # BLD AUTO: 8 10E3/UL (ref 4–11)

## 2021-10-16 PROCEDURE — 99232 SBSQ HOSP IP/OBS MODERATE 35: CPT | Performed by: INTERNAL MEDICINE

## 2021-10-16 PROCEDURE — 210N000001 HC R&B IMCU HEART CARE

## 2021-10-16 PROCEDURE — 36415 COLL VENOUS BLD VENIPUNCTURE: CPT | Performed by: INTERNAL MEDICINE

## 2021-10-16 PROCEDURE — 93306 TTE W/DOPPLER COMPLETE: CPT | Mod: 26 | Performed by: INTERNAL MEDICINE

## 2021-10-16 PROCEDURE — 96375 TX/PRO/DX INJ NEW DRUG ADDON: CPT

## 2021-10-16 PROCEDURE — 96372 THER/PROPH/DIAG INJ SC/IM: CPT | Performed by: STUDENT IN AN ORGANIZED HEALTH CARE EDUCATION/TRAINING PROGRAM

## 2021-10-16 PROCEDURE — 999N000157 HC STATISTIC RCP TIME EA 10 MIN

## 2021-10-16 PROCEDURE — 250N000011 HC RX IP 250 OP 636: Performed by: EMERGENCY MEDICINE

## 2021-10-16 PROCEDURE — 80053 COMPREHEN METABOLIC PANEL: CPT | Performed by: STUDENT IN AN ORGANIZED HEALTH CARE EDUCATION/TRAINING PROGRAM

## 2021-10-16 PROCEDURE — G0378 HOSPITAL OBSERVATION PER HR: HCPCS

## 2021-10-16 PROCEDURE — 36415 COLL VENOUS BLD VENIPUNCTURE: CPT | Performed by: EMERGENCY MEDICINE

## 2021-10-16 PROCEDURE — 255N000002 HC RX 255 OP 636: Performed by: STUDENT IN AN ORGANIZED HEALTH CARE EDUCATION/TRAINING PROGRAM

## 2021-10-16 PROCEDURE — 250N000013 HC RX MED GY IP 250 OP 250 PS 637: Performed by: INTERNAL MEDICINE

## 2021-10-16 PROCEDURE — 84484 ASSAY OF TROPONIN QUANT: CPT | Performed by: STUDENT IN AN ORGANIZED HEALTH CARE EDUCATION/TRAINING PROGRAM

## 2021-10-16 PROCEDURE — 250N000012 HC RX MED GY IP 250 OP 636 PS 637: Performed by: STUDENT IN AN ORGANIZED HEALTH CARE EDUCATION/TRAINING PROGRAM

## 2021-10-16 PROCEDURE — 85520 HEPARIN ASSAY: CPT | Performed by: EMERGENCY MEDICINE

## 2021-10-16 PROCEDURE — 85520 HEPARIN ASSAY: CPT | Performed by: INTERNAL MEDICINE

## 2021-10-16 PROCEDURE — 99223 1ST HOSP IP/OBS HIGH 75: CPT | Performed by: INTERNAL MEDICINE

## 2021-10-16 PROCEDURE — 82962 GLUCOSE BLOOD TEST: CPT

## 2021-10-16 PROCEDURE — 250N000012 HC RX MED GY IP 250 OP 636 PS 637: Performed by: INTERNAL MEDICINE

## 2021-10-16 PROCEDURE — 87635 SARS-COV-2 COVID-19 AMP PRB: CPT | Performed by: STUDENT IN AN ORGANIZED HEALTH CARE EDUCATION/TRAINING PROGRAM

## 2021-10-16 PROCEDURE — 250N000011 HC RX IP 250 OP 636: Performed by: STUDENT IN AN ORGANIZED HEALTH CARE EDUCATION/TRAINING PROGRAM

## 2021-10-16 PROCEDURE — 250N000013 HC RX MED GY IP 250 OP 250 PS 637: Performed by: STUDENT IN AN ORGANIZED HEALTH CARE EDUCATION/TRAINING PROGRAM

## 2021-10-16 PROCEDURE — C9113 INJ PANTOPRAZOLE SODIUM, VIA: HCPCS | Performed by: STUDENT IN AN ORGANIZED HEALTH CARE EDUCATION/TRAINING PROGRAM

## 2021-10-16 PROCEDURE — 999N000208 ECHOCARDIOGRAM COMPLETE

## 2021-10-16 PROCEDURE — 85025 COMPLETE CBC W/AUTO DIFF WBC: CPT | Performed by: STUDENT IN AN ORGANIZED HEALTH CARE EDUCATION/TRAINING PROGRAM

## 2021-10-16 PROCEDURE — 36415 COLL VENOUS BLD VENIPUNCTURE: CPT | Performed by: STUDENT IN AN ORGANIZED HEALTH CARE EDUCATION/TRAINING PROGRAM

## 2021-10-16 RX ORDER — ALBUTEROL SULFATE 90 UG/1
2 AEROSOL, METERED RESPIRATORY (INHALATION) EVERY 6 HOURS PRN
Status: DISCONTINUED | OUTPATIENT
Start: 2021-10-16 | End: 2021-10-18 | Stop reason: HOSPADM

## 2021-10-16 RX ORDER — FLUTICASONE PROPIONATE 50 MCG
1 SPRAY, SUSPENSION (ML) NASAL DAILY PRN
Status: DISCONTINUED | OUTPATIENT
Start: 2021-10-16 | End: 2021-10-18 | Stop reason: HOSPADM

## 2021-10-16 RX ORDER — ACETAMINOPHEN 325 MG/1
650 TABLET ORAL EVERY 4 HOURS PRN
Status: DISCONTINUED | OUTPATIENT
Start: 2021-10-16 | End: 2021-10-18 | Stop reason: HOSPADM

## 2021-10-16 RX ORDER — CLONAZEPAM 0.5 MG/1
1 TABLET ORAL 2 TIMES DAILY PRN
Status: DISCONTINUED | OUTPATIENT
Start: 2021-10-16 | End: 2021-10-18 | Stop reason: HOSPADM

## 2021-10-16 RX ORDER — ONDANSETRON 2 MG/ML
4 INJECTION INTRAMUSCULAR; INTRAVENOUS EVERY 6 HOURS PRN
Status: DISCONTINUED | OUTPATIENT
Start: 2021-10-16 | End: 2021-10-18 | Stop reason: HOSPADM

## 2021-10-16 RX ORDER — TRAZODONE HYDROCHLORIDE 50 MG/1
50 TABLET, FILM COATED ORAL AT BEDTIME
Status: DISCONTINUED | OUTPATIENT
Start: 2021-10-16 | End: 2021-10-18 | Stop reason: HOSPADM

## 2021-10-16 RX ORDER — NICOTINE POLACRILEX 4 MG
15-30 LOZENGE BUCCAL
Status: DISCONTINUED | OUTPATIENT
Start: 2021-10-16 | End: 2021-10-18 | Stop reason: HOSPADM

## 2021-10-16 RX ORDER — SIMETHICONE 80 MG
40 TABLET,CHEWABLE ORAL EVERY 6 HOURS PRN
Status: DISCONTINUED | OUTPATIENT
Start: 2021-10-16 | End: 2021-10-18 | Stop reason: HOSPADM

## 2021-10-16 RX ORDER — LISINOPRIL 5 MG/1
5 TABLET ORAL DAILY
Status: DISCONTINUED | OUTPATIENT
Start: 2021-10-16 | End: 2021-10-18

## 2021-10-16 RX ORDER — DEXTROSE MONOHYDRATE 25 G/50ML
25-50 INJECTION, SOLUTION INTRAVENOUS
Status: DISCONTINUED | OUTPATIENT
Start: 2021-10-16 | End: 2021-10-18 | Stop reason: HOSPADM

## 2021-10-16 RX ORDER — ONDANSETRON 4 MG/1
4 TABLET, ORALLY DISINTEGRATING ORAL EVERY 6 HOURS PRN
Status: DISCONTINUED | OUTPATIENT
Start: 2021-10-16 | End: 2021-10-18 | Stop reason: HOSPADM

## 2021-10-16 RX ORDER — ASPIRIN 81 MG/1
81 TABLET ORAL DAILY
Status: DISCONTINUED | OUTPATIENT
Start: 2021-10-16 | End: 2021-10-18 | Stop reason: HOSPADM

## 2021-10-16 RX ORDER — ATORVASTATIN CALCIUM 10 MG/1
20 TABLET, FILM COATED ORAL AT BEDTIME
Status: DISCONTINUED | OUTPATIENT
Start: 2021-10-16 | End: 2021-10-17

## 2021-10-16 RX ORDER — BUSPIRONE HYDROCHLORIDE 7.5 MG/1
7.5 TABLET ORAL 2 TIMES DAILY
Status: DISCONTINUED | OUTPATIENT
Start: 2021-10-16 | End: 2021-10-18 | Stop reason: HOSPADM

## 2021-10-16 RX ADMIN — INSULIN ASPART 1 UNITS: 100 INJECTION, SOLUTION INTRAVENOUS; SUBCUTANEOUS at 16:06

## 2021-10-16 RX ADMIN — HEPARIN SODIUM 900 UNITS/HR: 10000 INJECTION, SOLUTION INTRAVENOUS at 10:48

## 2021-10-16 RX ADMIN — TRAZODONE HYDROCHLORIDE 50 MG: 50 TABLET ORAL at 01:32

## 2021-10-16 RX ADMIN — ACETAMINOPHEN 650 MG: 325 TABLET ORAL at 16:04

## 2021-10-16 RX ADMIN — BUSPIRONE HYDROCHLORIDE 7.5 MG: 7.5 TABLET ORAL at 07:59

## 2021-10-16 RX ADMIN — HEPARIN SODIUM 1050 UNITS/HR: 10000 INJECTION, SOLUTION INTRAVENOUS at 21:31

## 2021-10-16 RX ADMIN — INSULIN GLARGINE 15 UNITS: 100 INJECTION, SOLUTION SUBCUTANEOUS at 21:30

## 2021-10-16 RX ADMIN — INSULIN GLARGINE 15 UNITS: 100 INJECTION, SOLUTION SUBCUTANEOUS at 01:32

## 2021-10-16 RX ADMIN — INSULIN ASPART 1 UNITS: 100 INJECTION, SOLUTION INTRAVENOUS; SUBCUTANEOUS at 20:04

## 2021-10-16 RX ADMIN — TRAZODONE HYDROCHLORIDE 50 MG: 50 TABLET ORAL at 21:30

## 2021-10-16 RX ADMIN — LISINOPRIL 5 MG: 5 TABLET ORAL at 07:59

## 2021-10-16 RX ADMIN — ATORVASTATIN CALCIUM 20 MG: 10 TABLET, FILM COATED ORAL at 21:30

## 2021-10-16 RX ADMIN — BUSPIRONE HYDROCHLORIDE 7.5 MG: 7.5 TABLET ORAL at 21:30

## 2021-10-16 RX ADMIN — SIMETHICONE 40 MG: 80 TABLET, CHEWABLE ORAL at 13:14

## 2021-10-16 RX ADMIN — ATORVASTATIN CALCIUM 20 MG: 10 TABLET, FILM COATED ORAL at 01:32

## 2021-10-16 RX ADMIN — CLONAZEPAM 1 MG: 1 TABLET ORAL at 09:44

## 2021-10-16 RX ADMIN — INSULIN ASPART: 100 INJECTION, SOLUTION INTRAVENOUS; SUBCUTANEOUS at 20:05

## 2021-10-16 RX ADMIN — PANTOPRAZOLE SODIUM 40 MG: 40 INJECTION, POWDER, FOR SOLUTION INTRAVENOUS at 07:54

## 2021-10-16 RX ADMIN — SERTRALINE HYDROCHLORIDE 200 MG: 50 TABLET ORAL at 07:59

## 2021-10-16 RX ADMIN — INSULIN ASPART 1 UNITS: 100 INJECTION, SOLUTION INTRAVENOUS; SUBCUTANEOUS at 12:06

## 2021-10-16 RX ADMIN — PERFLUTREN 3 ML: 6.52 INJECTION, SUSPENSION INTRAVENOUS at 11:35

## 2021-10-16 RX ADMIN — ASPIRIN 81 MG: 81 TABLET, COATED ORAL at 07:59

## 2021-10-16 NOTE — ED NOTES
Neuro: Oriented x4  IV/drain: PIV right arm  VSS: VSS on RA  Resp: Clear    Cardio: trops elevated  GI/: Voiding adequately ,   Pain/Discomfort: abd discomfort  Activity: Up independently   Skin: CDI .

## 2021-10-16 NOTE — PROGRESS NOTES
RT Note    Order placed for CPAP/BIPAP as at home.     Patient brought in home CPAP machine. Machine was setup for patient and ready at bedside.     Kaitlin Fuller RT

## 2021-10-16 NOTE — PROGRESS NOTES
Progress Note    Assessment/Plan  71-year-old female with past medical history of hypertension, hiatal hernia, hyperlipidemia, type II DM, major depressive disorder with anxiety presents with burning epigastric abdominal pain and found to have elevated troponin.  Currently patient is pain-free on a heparin drip.  Cardiology is planning to do angiogram on Monday.    Non-STEMI  --Currently on aspirin and IV heparin drip  --No epigastric pain after GI cocktail  --Awaiting echocardiogram results  --Continue carbohydrate diet  Troponins are trending down  --Hemoglobin is stable    Insulin-dependent diabetes  --Controlled  --Continue 15 units of Lantus.  Order NovoLog 1 unit for 15 g of carbohydrate  --Continue sliding scale insulin  --Continue diabetic diet  --Continue to hold Metformin and glipizide    Headache non-intractable  --Patient not on nitroglycerin  --ordered Tylenol PRN for headache.     Incidental pulmonary nodule  --0.5 cm in the lingula  --Follow-up in the pulmonary clinic in in 3 months    NICHOLE  --Continue CPAP    Morbid obesity BMI greater than 35    Epigastric pain/GERD/hiatal hernia  --Continue IV PPI  --Change to p.o. Protonix at discharge      Barriers to discharge: Coronary angiogram    Anticipated discharge date: 10/18        Subjective  Patient new to me.  Chart reviewed.  Denies any epigastric pain.  He is hungry and wants to eat.  Has headache.  Not on nitroglycerin patch.  Ordered simethicone for flatulence.  Denies any dizziness lightheadedness no shortness of breath at rest      Objective    /64   Pulse 105   Temp 98.6  F (37  C) (Oral)   Resp 16   Wt 111.1 kg (245 lb)   SpO2 93%   BMI 42.05 kg/m    Weight:   Wt Readings from Last 5 Encounters:   10/15/21 111.1 kg (245 lb)       No intake/output data recorded.  No intake/output data recorded.          Physical Exam  Alert, oriented*3  No pallor, icterus, clubbing, cyanosis  Body mass index is 42.05 kg/m .  Morbidly obese  No sinus  tenderness  Moist membranes  Neck supple  CVS: S1 S2-N, no murmurs, gallops, rubs  Resp: B/L vesicular breath sounds, no wheezing, crackles  Abd: soft, No t/g/r  Neuro: no involuntary movements such as tremors  Vasc: no leg edema     Pertinent Labs  ----------------------  Recent Labs   Lab 10/16/21  1151 10/16/21  0949 10/16/21  0114 10/15/21  2124 10/15/21  1704   NA  --  139  --   --  139   POTASSIUM  --  4.1  --   --  4.5   CO2  --  25  --   --  25   BUN  --  11  --   --  13   CR  --  0.85  --   --  0.79   MAG  --   --   --  1.5*  --    * 168* 102*  --  162*   ALBUMIN  --  3.5  --   --  3.8   BILITOTAL  --  0.5  --   --  0.4   ALKPHOS  --  64  --   --  70   ALT  --  20  --   --  20   AST  --  18  --   --  18     Recent Labs   Lab 10/16/21  0949 10/15/21  2252 10/15/21  1704   WBC 8.0 9.0 8.8   HGB 11.5* 11.5* 12.4   HCT 36.0 34.8* 38.6    300 329     No results for input(s): INR in the last 168 hours.  Glucose Values Latest Ref Rng & Units 10/15/2021 10/16/2021   Bedside Glucose (mg/dl )  - -- --   GLUCOSE 70 - 125 mg/dL 162(H) 168(H)   Some recent data might be hidden         Pertinent Radiology   Radiology Results: Personally reviewed impression/s  CT Chest/Abdomen/Pelvis w Contrast    Result Date: 10/15/2021  EXAM: CT CHEST/ABDOMEN/PELVIS W CONTRAST LOCATION: [facility] DATE/TIME: 10/15/2021 6:36 PM INDICATION: Abdominal pain and vomiting. Hiatal hernia. COMPARISON: None. TECHNIQUE: CT scan of the chest, abdomen, and pelvis was performed following injection of IV contrast. Multiplanar reformats were obtained. Dose reduction techniques were used. CONTRAST: [id number] 100ml FINDINGS: LUNGS AND PLEURA: Calcified granuloma. No pleural effusion. A 0.5 cm nodular opacity in the base of the lingula, image 76:3. MEDIASTINUM/AXILLAE: Moderate hiatal hernia with approximately half of the stomach in the chest. No lymphadenopathy. CORONARY ARTERY CALCIFICATION: Moderate.  HEPATOBILIARY: A subcentimeter hypodensity in the left lobe of the liver which is too small to characterize. PANCREAS: Normal. SPLEEN: Normal. ADRENAL GLANDS: Normal. KIDNEYS/BLADDER: Normal. BOWEL: No obstruction or inflammatory change. Normal appendix. LYMPH NODES: Normal. VASCULATURE: Mild atherosclerosis. PELVIC ORGANS: Heterogeneous uterus with multiple calcified fibroids. MUSCULOSKELETAL: Degenerative changes of the spine and hips.     IMPRESSION: 1.  Moderate hiatal hernia. 2.  A 0.5 cm pulmonary nodule in the lingula. See guidelines below. REFERENCE: Guidelines for Management of Incidental Pulmonary Nodules Detected on CT Images: From the Fleischner Society 2017. Guidelines apply to incidental nodules in patients who are 35 years or older. Guidelines do not apply to lung cancer screening, patients with immunosuppression, or patients with known primary cancer. SINGLE NODULE Nodule size <6 mm Low-risk patients: No follow-up needed. High-risk patients: Optional follow-up at 12 months.     Chest XR,  PA & LAT    Result Date: 10/15/2021  EXAM: XR CHEST 2 VW LOCATION: [facility] DATE/TIME: 10/15/2021 10:00 PM INDICATION: chest pain COMPARISON: CT chest exam 10/15/2021     IMPRESSION: Heart size and pulmonary vascularity normal. Large esophageal hiatal hernia. The lungs are clear.    EKG Results: not reviewed.

## 2021-10-16 NOTE — PHARMACY-ADMISSION MEDICATION HISTORY
Pharmacy Note - Admission Medication History    Pertinent Provider Information:      ______________________________________________________________________    Prior To Admission (PTA) med list completed and updated in EMR.       PTA Med List   Medication Sig Last Dose     albuterol (PROAIR HFA/PROVENTIL HFA/VENTOLIN HFA) 108 (90 Base) MCG/ACT inhaler Inhale 2 puffs into the lungs every 6 hours as needed for shortness of breath / dyspnea or wheezing Past Month at Unknown time     aspirin 81 MG EC tablet Take 81 mg by mouth daily 10/14/2021 at hs     atorvastatin (LIPITOR) 20 MG tablet Take 20 mg by mouth At Bedtime 10/14/2021 at hs     busPIRone (BUSPAR) 7.5 MG tablet Take 7.5 mg by mouth 2 times daily 10/15/2021 at am     cetirizine (ZYRTEC) 10 MG tablet Take 10 mg by mouth daily 10/15/2021 at am     clonazePAM (KLONOPIN) 1 MG tablet Take 1 mg by mouth 2 times daily as needed for anxiety Past Month at Unknown time     cyclobenzaprine (FLEXERIL) 10 MG tablet Take 10 mg by mouth 3 times daily as needed for muscle spasms Past Month at Unknown time     famotidine (PEPCID) 20 MG tablet Take 20 mg by mouth daily 10/15/2021 at am     fluticasone (FLONASE) 50 MCG/ACT nasal spray Spray 1 spray into both nostrils daily as needed for rhinitis or allergies Past Month at Unknown time     glipiZIDE (GLUCOTROL XL) 10 MG 24 hr tablet Take 20 mg by mouth daily 10/15/2021 at am     insulin glargine (LANTUS PEN) 100 UNIT/ML pen Inject 22 Units Subcutaneous At Bedtime 10/14/2021 at hs     lisinopril (ZESTRIL) 5 MG tablet Take 5 mg by mouth daily 10/15/2021 at am     metFORMIN (GLUCOPHAGE) 500 MG tablet Take 1,000 mg by mouth 2 times daily (with meals) 10/15/2021 at am     sertraline (ZOLOFT) 100 MG tablet Take 200 mg by mouth daily 10/15/2021 at am     traZODone (DESYREL) 50 MG tablet Take 50 mg by mouth At Bedtime 10/14/2021 at hs       Information source(s): Patient and CareEverywhere/SureScripts  Method of interview communication:  in-person    Summary of Changes to PTA Med List  New: all medications  Discontinued: tramadol  Changed:     Patient was asked about OTC/herbal products specifically.  PTA med list reflects this.    In the past week, patient estimated taking medication this percent of the time:  greater than 90%.    Allergies were reviewed, assessed, and updated with the patient.      Patient did not bring any medications to the hospital and can't retrieve from home. No multi-dose medications are available for use during hospital stay.     The information provided in this note is only as accurate as the sources available at the time of the update(s).    Thank you for the opportunity to participate in the care of this patient.    Orly Deal [facility]  10/15/2021 10:40 PM

## 2021-10-16 NOTE — UTILIZATION REVIEW
Admission Status; Secondary Review Determination   Under the authority of the Utilization Management Committee, the utilization review process indicated a secondary review on Brenda Christopher. The review outcome is based on review of the medical records, discussions with staff, and applying clinical experience noted on the date of the review.   (x) Inpatient Status Appropriate - This patient's medical care is consistent with medical management for inpatient care and reasonable inpatient medical practice.     RATIONALE FOR DETERMINATION   71-year-old female with past medical history of hypertension, hiatal hernia, hyperlipidemia, type II DM, major depressive disorder/ anxiety presents who presented to ER for epigastric/chest pain , admitted with NSTEMI, cardiology consult , on IV heparin , plan for coronary angiogram on Monday     At the time of admission with the information available to the attending physician more than 2 nights Hospital complex care was anticipated, based on patient risk of adverse outcome if treated as outpatient and complex care required. Inpatient admission is appropriate based on the Medicare guidelines.   The information on this document is developed by the utilization review team in order for the business office to ensure compliance. This only denotes the appropriateness of proper admission status and does not reflect the quality of care rendered.   The definitions of Inpatient Status and Observation Status used in making the determination above are those provided in the CMS Coverage Manual, Chapter 1 and Chapter 6, section 70.4.   Sincerely,   Ren Holbrook MD  Utilization Review  Physician Advisor  [facility]

## 2021-10-16 NOTE — CONSULTS
HEART CARE CONSULTATON NOTE        Assessment/Recommendations   Assessment:  1.  Non-ST elevation myocardial infarction: Significant risk factors with elevated troponin and discomfort in the lower chest epigastric area.  Atypical features however given troponin elevation and risk factors would recommend further evaluation with coronary angiography and discussed this with her today.  She is in agreement.  She understands risks and benefits of the procedure.  2.  Morbid obesity  3.  Hiatal hernia  4.  Diabetes mellitus type 2 insulin-dependent  5.  NICHOLE on CPAP  6.  Hypertension  Plan:  1.  Coronary angiogram with possible intervention.  We will plan on Monday unless able to do this sooner over the weekend  2.  Continue on aspirin and heparin drip  3.  Continue home medications       History of Present Illness/Subjective    HPI: Brenda Christopher is a 71 year old female with history of insulin-dependent diabetes mellitus poorly controlled, depression/anxiety, morbid obesity, hypertension who presented to the ED with complaints of burning lower chest and epigastric discomfort with nausea.  She felt that it was GERD.  Troponin elevated mildly at 0.34.  Twelve-lead EKG personally reviewed and shows normal sinus rhythm with first-degree AV block at 94 bpm with no significant ST-T abnormalities.  She is very sedentary.  Chronic dyspnea on exertion.  Some of her discomfort did improve after GI cocktail.       Physical Examination  Review of Systems   VITALS: BP (!) 133/94   Pulse 95   Temp 98  F (36.7  C) (Temporal)   Resp 29   Wt 111.1 kg (245 lb)   SpO2 97%   BMI 42.05 kg/m    BMI: Body mass index is 42.05 kg/m .  Wt Readings from Last 3 Encounters:   10/15/21 111.1 kg (245 lb)     No intake or output data in the 24 hours ending 10/16/21 1214  General Appearance:   no distress, normal body habitus   ENT/Mouth: membranes moist, no oral lesions or bleeding gums.      EYES:  no scleral icterus, normal conjunctivae    Neck: no carotid bruits or thyromegaly   Chest/Lungs:   lungs are clear to auscultation   Cardiovascular:   Regular. Normal first and second heart sounds with no murmurs mild edema bilaterally    Abdomen:  no organomegaly, masses, bruits, or tenderness; bowel sounds are present   Extremities: no cyanosis or clubbing   Skin: no xanthelasma, warm.    Neurologic: normal  bilateral, no tremors     Psychiatric: alert and oriented x3, calm     Review Of Systems  Skin: negative  Eyes: negative  Ears/Nose/Throat: negative  Respiratory: Dyspnea on exertion-   Cardiovascular: chest pain  Gastrointestinal: negative  Genitourinary: negative  Musculoskeletal: negative  Neurologic: negative  Psychiatric: negative  Hematologic/Lymphatic/Immunologic: negative  Endocrine: negative          Lab Results    Chemistry/lipid CBC Cardiac Enzymes/BNP/TSH/INR   No results for input(s): CHOL, HDL, LDL, TRIG, CHOLHDLRATIO in the last 22363 hours.  No results for input(s): LDL in the last 93872 hours.  Recent Labs   Lab Test 10/16/21  1151 10/16/21  0949 10/16/21  0114   NA  --  139  --    POTASSIUM  --  4.1  --    CHLORIDE  --  102  --    CO2  --  25  --    * 168*   < >   BUN  --  11  --    CR  --  0.85  --    GFRESTIMATED  --  70  --    GRECIA  --  9.5  --     < > = values in this interval not displayed.     Recent Labs   Lab Test 10/16/21  0949 10/15/21  1704   CR 0.85 0.79     Recent Labs   Lab Test 10/15/21  2252   A1C 8.6*          Recent Labs   Lab Test 10/16/21  0949   WBC 8.0   HGB 11.5*   HCT 36.0   MCV 91        Recent Labs   Lab Test 10/16/21  0949 10/15/21  2252 10/15/21  1704   HGB 11.5* 11.5* 12.4    Recent Labs   Lab Test 10/16/21  0949 10/15/21  2124 10/15/21  2026   TROPONINI 0.16 0.34* 0.28     No results for input(s): BNP, NTBNPI, NTBNP in the last 07623 hours.  No results for input(s): TSH in the last 92292 hours.  No results for input(s): INR in the last 62699 hours.     Medical History  Surgical History  Family History Social History   Diabetes mellitus type 2  Hypertension  Dyslipidemia Past Surgical History:   Procedure Laterality Date     BIOPSY BREAST Right 2007     BREAST EXCISIONAL BIOPSY Right 2007     IR LUMBAR EPIDURAL STEROID INJECTION  12/12/2003     IR LUMBAR EPIDURAL STEROID INJECTION  5/21/2004     IR LUMBAR EPIDURAL STEROID INJECTION  6/5/2006     IR LUMBAR EPIDURAL STEROID INJECTION  6/21/2006     IR LUMBAR EPIDURAL STEROID INJECTION  8/15/2008     Family History   Problem Relation Age of Onset     Lung Cancer Father      Lung Cancer Brother      Breast Cancer No family hx of         Social History     Socioeconomic History     Marital status:      Spouse name: Not on file     Number of children: Not on file     Years of education: Not on file     Highest education level: Not on file   Occupational History     Not on file   Tobacco Use     Smoking status: Not on file   Substance and Sexual Activity     Alcohol use: Not on file     Drug use: Not on file     Sexual activity: Not on file   Other Topics Concern     Not on file   Social History Narrative     Not on file     Social Determinants of Health     Financial Resource Strain:      Difficulty of Paying Living Expenses:    Food Insecurity:      Worried About Running Out of Food in the Last Year:      Ran Out of Food in the Last Year:    Transportation Needs:      Lack of Transportation (Medical):      Lack of Transportation (Non-Medical):    Physical Activity:      Days of Exercise per Week:      Minutes of Exercise per Session:    Stress:      Feeling of Stress :    Social Connections:      Frequency of Communication with Friends and Family:      Frequency of Social Gatherings with Friends and Family:      Attends Sabianism Services:      Active Member of Clubs or Organizations:      Attends Club or Organization Meetings:      Marital Status:    Intimate Partner Violence:      Fear of Current or Ex-Partner:      Emotionally Abused:       Physically Abused:      Sexually Abused:          Medications  Allergies   Current Outpatient Medications   Medication Sig Dispense Refill     albuterol (PROAIR HFA/PROVENTIL HFA/VENTOLIN HFA) 108 (90 Base) MCG/ACT inhaler Inhale 2 puffs into the lungs every 6 hours as needed for shortness of breath / dyspnea or wheezing       aspirin 81 MG EC tablet Take 81 mg by mouth daily       atorvastatin (LIPITOR) 20 MG tablet Take 20 mg by mouth At Bedtime       busPIRone (BUSPAR) 7.5 MG tablet Take 7.5 mg by mouth 2 times daily       cetirizine (ZYRTEC) 10 MG tablet Take 10 mg by mouth daily       clonazePAM (KLONOPIN) 1 MG tablet Take 1 mg by mouth 2 times daily as needed for anxiety       cyclobenzaprine (FLEXERIL) 10 MG tablet Take 10 mg by mouth 3 times daily as needed for muscle spasms       famotidine (PEPCID) 20 MG tablet Take 20 mg by mouth daily       fluticasone (FLONASE) 50 MCG/ACT nasal spray Spray 1 spray into both nostrils daily as needed for rhinitis or allergies       glipiZIDE (GLUCOTROL XL) 10 MG 24 hr tablet Take 20 mg by mouth daily       insulin glargine (LANTUS PEN) 100 UNIT/ML pen Inject 22 Units Subcutaneous At Bedtime       lisinopril (ZESTRIL) 5 MG tablet Take 5 mg by mouth daily       metFORMIN (GLUCOPHAGE) 500 MG tablet Take 1,000 mg by mouth 2 times daily (with meals)       sertraline (ZOLOFT) 100 MG tablet Take 200 mg by mouth daily       traZODone (DESYREL) 50 MG tablet Take 50 mg by mouth At Bedtime        No Known Allergies      Priya Sanderson MD

## 2021-10-16 NOTE — H&P
ADMISSION HISTORY & PHYSICAL        Patient YOB: 1950  Admit date: 10/15/2021  Date of Service: 10/15/2021    ASSESSMENT AND PLAN:    71-year-old female with past medical history of hypertension, hiatal hernia, hyperlipidemia, type II DM, major depressive disorder with anxiety presents with burning epigastric abdominal pain and found to have elevated troponin    Epigastric abdominal pain  Elevated troponin/NSTEMI  -CT abdomen and chest showed moderate hiatal hernia   -Given history of diabetes and female patient with possible  atypical presentation for ACS  -Initial troponins 0.09->0.28> 0.34, EKG sinus rhythm with no ST-T segment changes  -Epigastric pain resolved after receiving GI cocktail  -Serial troponins, continue heparin drip as started at the ED, c/w PTA aspirin and statin  -Echo in the AM and cardiology consult , Keep NPO    Esophageal Hiatal hernia  Nausea vomiting  -N/V, epigastric pain resolved after GI cocktail  -IV PPI, antiemetics as needed      Type II DM  -Last A1c 8.7% 9/2021  -At home takes Lantus 22 units p.m. and glipizide 20 mg a.m., Metformin 2000 mg daily.  -Will resume Lantus at 15 units, SSI. Hold glipizide and Metformin      Incidental radiographic finding  -0.5 cm pulmonary nodule in the lingula  -Outpatient follow-up      Hypertension  -Continue home meds after med rec    NICHOLE  -CPAP at night    DVT prophylaxis-on heparin drip    CODE STATUS: Full code    Disposition:  -Anticipated Length of Stay in midnights and medical necessity (including a midnight in the Emergency Department after triage if applicable): <  2days      CHIEF COMPLAINT:  Gastric abdominal pain    HISTORY OF PRESENTING ILLNESS:  71-year-old female with past medical history of hypertension, hiatal hernia, hyperlipidemia, type II DM, major depressive disorder with anxiety presents with burning epigastric abdominal pain. Started to have burning epigastric abdominal pain since around 2 PM today after having .  Pain radiated down to her bellybutton and was associated with  nausea and vomiting, vomited about 5 times-ingested matter. Denies any chest pain, palpitations, shortness of breath. No fever, chills, cough, bowel habit changes. Work-up at ED significant for serially trending troponin up to 0.34, EKG was unremarkable for any ST-T segment changes. Pain, nausea vomiting has completely resolved after receiving GI cocktail and antiemetics. Started on heparin drip admitted for further care.        PMH/PSH:  Patient Active Problem List   Diagnosis     Hiatal hernia     Pulmonary nodule     Upper abdominal pain       History reviewed. No pertinent past medical history.     Past Surgical History:   Procedure Laterality Date     BIOPSY BREAST Right 2007     BREAST EXCISIONAL BIOPSY Right 2007     IR LUMBAR EPIDURAL STEROID INJECTION  12/12/2003     IR LUMBAR EPIDURAL STEROID INJECTION  5/21/2004     IR LUMBAR EPIDURAL STEROID INJECTION  6/5/2006     IR LUMBAR EPIDURAL STEROID INJECTION  6/21/2006     IR LUMBAR EPIDURAL STEROID INJECTION  8/15/2008          ALLERGIES:  No Known Allergies    MEDICATIONS:  Reviewed.  Current Facility-Administered Medications   Medication     heparin 25,000 units in 0.45% NaCl 250 mL ANTICOAGULANT infusion     No current outpatient medications on file.       Current Facility-Administered Medications:      heparin 25,000 units in 0.45% NaCl 250 mL ANTICOAGULANT infusion, 0-5,000 Units/hr, Intravenous, Continuous, Alana Hercules MD  No current outpatient medications on file.   Scheduled Meds:  Continuous Infusions:    heparin       PRN Meds:.    SOCIAL HISTORY:  Social History     Socioeconomic History     Marital status:      Spouse name: Not on file     Number of children: Not on file     Years of education: Not on file     Highest education level: Not on file   Occupational History     Not on file   Tobacco Use     Smoking status: Not on file   Substance and Sexual Activity      Alcohol use: Not on file     Drug use: Not on file     Sexual activity: Not on file   Other Topics Concern     Not on file   Social History Narrative     Not on file     Social Determinants of Health     Financial Resource Strain:      Difficulty of Paying Living Expenses:    Food Insecurity:      Worried About Running Out of Food in the Last Year:      Ran Out of Food in the Last Year:    Transportation Needs:      Lack of Transportation (Medical):      Lack of Transportation (Non-Medical):    Physical Activity:      Days of Exercise per Week:      Minutes of Exercise per Session:    Stress:      Feeling of Stress :    Social Connections:      Frequency of Communication with Friends and Family:      Frequency of Social Gatherings with Friends and Family:      Attends [employer] Services:      Active Member of Clubs or Organizations:      Attends Club or Organization Meetings:      Marital Status:    Intimate Partner Violence:      Fear of Current or Ex-Partner:      Emotionally Abused:      Physically Abused:      Sexually Abused:        FAMILY HISTORY:  Family History   Problem Relation Age of Onset     Lung Cancer Father      Lung Cancer Brother      Breast Cancer No family hx of        ROS:  12 point review of systems reviewed and is negative except for what has already been mentioned in HPI.       PHYSICAL EXAM:  GENRL:    /64   Pulse 96   Temp 98  F (36.7  C) (Temporal)   Resp 10   Wt 111.1 kg (245 lb)   SpO2 98%   BMI 42.05 kg/m    No intake/output data recorded.  No intake/output data recorded.  HEENT: NC/AT  CHEST: Clear to auscultation bilateral anteriorly, no ronchi or wheezing  HEART: S1S2 normal, regular.   ABDMN: Soft. Non-tender, non-distended.  No guarding or rigidity. Bowel sounds- active  EXTRM: bialteral  Nonpitting pedal edema   INTGM: No skin rash, no cyanosis or clubbing  MUSCULOSKELETAL: no joint tenderness or swelling on upper and lower extremities  NEURO: Alert and oriented. No  focal neurological deficit.        DIAGNOSTIC DATA:    Recent Labs   Lab 10/15/21  1704   WBC 8.8   HGB 12.4   HCT 38.6          Recent Labs   Lab 10/15/21  1704      CO2 25   BUN 13       No results for input(s): INR in the last 168 hours.    Recent Labs   Lab 10/15/21  1704      CO2 25   BUN 13   ALBUMIN 3.8   ALKPHOS 70   ALT 20   AST 18       [unfilled]    CT Chest/Abdomen/Pelvis w Contrast    Result Date: 10/15/2021  EXAM: CT CHEST/ABDOMEN/PELVIS W CONTRAST LOCATION: [facility] DATE/TIME: 10/15/2021 6:36 PM INDICATION: Abdominal pain and vomiting. Hiatal hernia. COMPARISON: None. TECHNIQUE: CT scan of the chest, abdomen, and pelvis was performed following injection of IV contrast. Multiplanar reformats were obtained. Dose reduction techniques were used. CONTRAST: [id number] 100ml FINDINGS: LUNGS AND PLEURA: Calcified granuloma. No pleural effusion. A 0.5 cm nodular opacity in the base of the lingula, image 76:3. MEDIASTINUM/AXILLAE: Moderate hiatal hernia with approximately half of the stomach in the chest. No lymphadenopathy. CORONARY ARTERY CALCIFICATION: Moderate. HEPATOBILIARY: A subcentimeter hypodensity in the left lobe of the liver which is too small to characterize. PANCREAS: Normal. SPLEEN: Normal. ADRENAL GLANDS: Normal. KIDNEYS/BLADDER: Normal. BOWEL: No obstruction or inflammatory change. Normal appendix. LYMPH NODES: Normal. VASCULATURE: Mild atherosclerosis. PELVIC ORGANS: Heterogeneous uterus with multiple calcified fibroids. MUSCULOSKELETAL: Degenerative changes of the spine and hips.     IMPRESSION: 1.  Moderate hiatal hernia. 2.  A 0.5 cm pulmonary nodule in the lingula. See guidelines below. REFERENCE: Guidelines for Management of Incidental Pulmonary Nodules Detected on CT Images: From the Fleischner Society 2017. Guidelines apply to incidental nodules in patients who are 35 years or older. Guidelines do not apply to lung cancer screening,  patients with immunosuppression, or patients with known primary cancer. SINGLE NODULE Nodule size <6 mm Low-risk patients: No follow-up needed. High-risk patients: Optional follow-up at 12 months.     Chest XR,  PA & LAT    Result Date: 10/15/2021  EXAM: XR CHEST 2 VW LOCATION: [facility] DATE/TIME: 10/15/2021 10:00 PM INDICATION: chest pain COMPARISON: CT chest exam 10/15/2021     IMPRESSION: Heart size and pulmonary vascularity normal. Large esophageal hiatal hernia. The lungs are clear.      Patient's new lab studies reviewed personally.  Patient's new radiology reports reviewed personally.  I personally viewed and personally interpreted patient's EKG:    Note created using dragon voice recognition software.  Errors in spelling or words which seems out of context are unintentional.  Sounds alike errors may have escaped editing.     10/15/2021    Ronald Mccullough MD  [facility]

## 2021-10-17 LAB
ACT BLD: 151 SECONDS (ref 74–150)
GLUCOSE BLDC GLUCOMTR-MCNC: 134 MG/DL (ref 70–99)
GLUCOSE BLDC GLUCOMTR-MCNC: 148 MG/DL (ref 70–99)
GLUCOSE BLDC GLUCOMTR-MCNC: 151 MG/DL (ref 70–99)
GLUCOSE BLDC GLUCOMTR-MCNC: 158 MG/DL (ref 70–99)
GLUCOSE BLDC GLUCOMTR-MCNC: 183 MG/DL (ref 70–99)
GLUCOSE BLDC GLUCOMTR-MCNC: 188 MG/DL (ref 70–99)
GLUCOSE BLDC GLUCOMTR-MCNC: 261 MG/DL (ref 70–99)
UFH PPP CHRO-ACNC: 0.2 IU/ML
UFH PPP CHRO-ACNC: 0.6 IU/ML
UFH PPP CHRO-ACNC: <0.1 IU/ML

## 2021-10-17 PROCEDURE — 250N000012 HC RX MED GY IP 250 OP 636 PS 637: Performed by: INTERNAL MEDICINE

## 2021-10-17 PROCEDURE — 250N000013 HC RX MED GY IP 250 OP 250 PS 637: Performed by: INTERNAL MEDICINE

## 2021-10-17 PROCEDURE — 99233 SBSQ HOSP IP/OBS HIGH 50: CPT | Mod: 25 | Performed by: INTERNAL MEDICINE

## 2021-10-17 PROCEDURE — 258N000003 HC RX IP 258 OP 636: Performed by: INTERNAL MEDICINE

## 2021-10-17 PROCEDURE — 36415 COLL VENOUS BLD VENIPUNCTURE: CPT | Performed by: STUDENT IN AN ORGANIZED HEALTH CARE EDUCATION/TRAINING PROGRAM

## 2021-10-17 PROCEDURE — 93454 CORONARY ARTERY ANGIO S&I: CPT | Mod: 26 | Performed by: INTERNAL MEDICINE

## 2021-10-17 PROCEDURE — 85520 HEPARIN ASSAY: CPT | Performed by: INTERNAL MEDICINE

## 2021-10-17 PROCEDURE — C9113 INJ PANTOPRAZOLE SODIUM, VIA: HCPCS | Performed by: STUDENT IN AN ORGANIZED HEALTH CARE EDUCATION/TRAINING PROGRAM

## 2021-10-17 PROCEDURE — 250N000011 HC RX IP 250 OP 636: Performed by: STUDENT IN AN ORGANIZED HEALTH CARE EDUCATION/TRAINING PROGRAM

## 2021-10-17 PROCEDURE — 210N000001 HC R&B IMCU HEART CARE

## 2021-10-17 PROCEDURE — C1769 GUIDE WIRE: HCPCS | Performed by: INTERNAL MEDICINE

## 2021-10-17 PROCEDURE — 99231 SBSQ HOSP IP/OBS SF/LOW 25: CPT | Performed by: INTERNAL MEDICINE

## 2021-10-17 PROCEDURE — 93454 CORONARY ARTERY ANGIO S&I: CPT | Performed by: INTERNAL MEDICINE

## 2021-10-17 PROCEDURE — 255N000002 HC RX 255 OP 636: Performed by: INTERNAL MEDICINE

## 2021-10-17 PROCEDURE — C1894 INTRO/SHEATH, NON-LASER: HCPCS | Performed by: INTERNAL MEDICINE

## 2021-10-17 PROCEDURE — 36415 COLL VENOUS BLD VENIPUNCTURE: CPT | Performed by: INTERNAL MEDICINE

## 2021-10-17 PROCEDURE — 250N000009 HC RX 250: Performed by: INTERNAL MEDICINE

## 2021-10-17 PROCEDURE — 250N000013 HC RX MED GY IP 250 OP 250 PS 637: Performed by: STUDENT IN AN ORGANIZED HEALTH CARE EDUCATION/TRAINING PROGRAM

## 2021-10-17 PROCEDURE — 85347 COAGULATION TIME ACTIVATED: CPT

## 2021-10-17 PROCEDURE — 272N000001 HC OR GENERAL SUPPLY STERILE: Performed by: INTERNAL MEDICINE

## 2021-10-17 PROCEDURE — 250N000011 HC RX IP 250 OP 636: Performed by: INTERNAL MEDICINE

## 2021-10-17 PROCEDURE — B2111ZZ FLUOROSCOPY OF MULTIPLE CORONARY ARTERIES USING LOW OSMOLAR CONTRAST: ICD-10-PCS | Performed by: INTERNAL MEDICINE

## 2021-10-17 PROCEDURE — 85520 HEPARIN ASSAY: CPT | Performed by: STUDENT IN AN ORGANIZED HEALTH CARE EDUCATION/TRAINING PROGRAM

## 2021-10-17 RX ORDER — ATORVASTATIN CALCIUM 40 MG/1
80 TABLET, FILM COATED ORAL AT BEDTIME
Status: DISCONTINUED | OUTPATIENT
Start: 2021-10-17 | End: 2021-10-18 | Stop reason: HOSPADM

## 2021-10-17 RX ORDER — NALOXONE HYDROCHLORIDE 0.4 MG/ML
0.4 INJECTION, SOLUTION INTRAMUSCULAR; INTRAVENOUS; SUBCUTANEOUS
Status: ACTIVE | OUTPATIENT
Start: 2021-10-17 | End: 2021-10-17

## 2021-10-17 RX ORDER — PANTOPRAZOLE SODIUM 20 MG/1
40 TABLET, DELAYED RELEASE ORAL
Status: DISCONTINUED | OUTPATIENT
Start: 2021-10-18 | End: 2021-10-18 | Stop reason: HOSPADM

## 2021-10-17 RX ORDER — FENTANYL CITRATE 50 UG/ML
INJECTION, SOLUTION INTRAMUSCULAR; INTRAVENOUS
Status: DISCONTINUED | OUTPATIENT
Start: 2021-10-17 | End: 2021-10-17 | Stop reason: HOSPADM

## 2021-10-17 RX ORDER — IODIXANOL 320 MG/ML
INJECTION, SOLUTION INTRAVASCULAR
Status: DISCONTINUED | OUTPATIENT
Start: 2021-10-17 | End: 2021-10-17 | Stop reason: HOSPADM

## 2021-10-17 RX ORDER — ACETAMINOPHEN 325 MG/1
650 TABLET ORAL EVERY 4 HOURS PRN
Status: DISCONTINUED | OUTPATIENT
Start: 2021-10-17 | End: 2021-10-18 | Stop reason: HOSPADM

## 2021-10-17 RX ORDER — SODIUM CHLORIDE 9 MG/ML
75 INJECTION, SOLUTION INTRAVENOUS CONTINUOUS
Status: ACTIVE | OUTPATIENT
Start: 2021-10-17 | End: 2021-10-17

## 2021-10-17 RX ORDER — FLUMAZENIL 0.1 MG/ML
0.2 INJECTION, SOLUTION INTRAVENOUS
Status: ACTIVE | OUTPATIENT
Start: 2021-10-17 | End: 2021-10-17

## 2021-10-17 RX ORDER — NALOXONE HYDROCHLORIDE 0.4 MG/ML
0.2 INJECTION, SOLUTION INTRAMUSCULAR; INTRAVENOUS; SUBCUTANEOUS
Status: ACTIVE | OUTPATIENT
Start: 2021-10-17 | End: 2021-10-17

## 2021-10-17 RX ORDER — ATROPINE SULFATE 0.1 MG/ML
0.5 INJECTION INTRAVENOUS
Status: ACTIVE | OUTPATIENT
Start: 2021-10-17 | End: 2021-10-17

## 2021-10-17 RX ORDER — HYDRALAZINE HYDROCHLORIDE 20 MG/ML
10 INJECTION INTRAMUSCULAR; INTRAVENOUS
Status: DISCONTINUED | OUTPATIENT
Start: 2021-10-17 | End: 2021-10-18 | Stop reason: HOSPADM

## 2021-10-17 RX ADMIN — INSULIN ASPART 2 UNITS: 100 INJECTION, SOLUTION INTRAVENOUS; SUBCUTANEOUS at 09:00

## 2021-10-17 RX ADMIN — SODIUM CHLORIDE 75 ML/HR: 9 INJECTION, SOLUTION INTRAVENOUS at 15:57

## 2021-10-17 RX ADMIN — ATORVASTATIN CALCIUM 80 MG: 40 TABLET, FILM COATED ORAL at 21:24

## 2021-10-17 RX ADMIN — INSULIN ASPART 1 UNITS: 100 INJECTION, SOLUTION INTRAVENOUS; SUBCUTANEOUS at 05:34

## 2021-10-17 RX ADMIN — ASPIRIN 81 MG: 81 TABLET, COATED ORAL at 09:13

## 2021-10-17 RX ADMIN — INSULIN ASPART 1 UNITS: 100 INJECTION, SOLUTION INTRAVENOUS; SUBCUTANEOUS at 09:07

## 2021-10-17 RX ADMIN — INSULIN ASPART 4 UNITS: 100 INJECTION, SOLUTION INTRAVENOUS; SUBCUTANEOUS at 13:15

## 2021-10-17 RX ADMIN — INSULIN ASPART 1 UNITS: 100 INJECTION, SOLUTION INTRAVENOUS; SUBCUTANEOUS at 00:45

## 2021-10-17 RX ADMIN — LISINOPRIL 5 MG: 5 TABLET ORAL at 09:09

## 2021-10-17 RX ADMIN — TRAZODONE HYDROCHLORIDE 50 MG: 50 TABLET ORAL at 21:25

## 2021-10-17 RX ADMIN — BUSPIRONE HYDROCHLORIDE 7.5 MG: 7.5 TABLET ORAL at 21:24

## 2021-10-17 RX ADMIN — BUSPIRONE HYDROCHLORIDE 7.5 MG: 7.5 TABLET ORAL at 09:09

## 2021-10-17 RX ADMIN — INSULIN ASPART: 100 INJECTION, SOLUTION INTRAVENOUS; SUBCUTANEOUS at 18:14

## 2021-10-17 RX ADMIN — INSULIN GLARGINE 15 UNITS: 100 INJECTION, SOLUTION SUBCUTANEOUS at 21:25

## 2021-10-17 RX ADMIN — INSULIN ASPART 1 UNITS: 100 INJECTION, SOLUTION INTRAVENOUS; SUBCUTANEOUS at 22:12

## 2021-10-17 RX ADMIN — SERTRALINE HYDROCHLORIDE 200 MG: 50 TABLET ORAL at 09:10

## 2021-10-17 RX ADMIN — PANTOPRAZOLE SODIUM 40 MG: 40 INJECTION, POWDER, FOR SOLUTION INTRAVENOUS at 09:10

## 2021-10-17 RX ADMIN — ACETAMINOPHEN 650 MG: 325 TABLET ORAL at 15:04

## 2021-10-17 RX ADMIN — INSULIN ASPART 1 UNITS: 100 INJECTION, SOLUTION INTRAVENOUS; SUBCUTANEOUS at 13:15

## 2021-10-17 ASSESSMENT — ACTIVITIES OF DAILY LIVING (ADL): DEPENDENT_IADLS:: INDEPENDENT

## 2021-10-17 NOTE — PLAN OF CARE
Pt. Alert and oriented. Continues on tele. Continues on Heparin drip. On CPAP over the night. Denies pain. Vital signs stable. No acute events over the night. Will continue to monitor.    Deng Mehta RN    Problem: Adult Inpatient Plan of Care  Goal: Absence of Hospital-Acquired Illness or Injury  Outcome: No Change  Intervention: Identify and Manage Fall Risk  Recent Flowsheet Documentation  Taken 10/17/2021 0000 by Deng Mehta, RN  Safety Promotion/Fall Prevention:    nonskid shoes/slippers when out of bed    patient and family education  Goal: Optimal Comfort and Wellbeing  Outcome: No Change     Problem: Risk for Delirium  Goal: Improved Sleep  Outcome: No Change

## 2021-10-17 NOTE — PROVIDER NOTIFICATION
Checked in with patient to see if she needed assistance with interface placement. Stated she's independent with system--no assistance needed. Encouraged patient to reach out should she ever need help.      10/16/21 2040   Home O2/Equipment Set-Up   Home O2 Device CPAP   Pt Owned Device Yes;Clean;Functional;Pt. Demonstrates Use     /62 (BP Location: Left arm)   Pulse 103   Temp 99.9  F (37.7  C) (Oral)   Resp 16   Wt 111.1 kg (245 lb)   SpO2 96%   BMI 42.05 kg/m       Ab Mckeon, RRT

## 2021-10-17 NOTE — PLAN OF CARE
Problem: Adult Inpatient Plan of Care  Goal: Optimal Comfort and Wellbeing  10/16/2021 2323 by Barbara Barraza RN  Outcome: Improving     Problem: Hyperglycemia  Goal: Blood Glucose Level Within Targeted Range  Outcome: Improving       Tele Rhythm- Sinus Rhythm  with First degree AVB. BG- 143 and 189, sliding scale insulin given and Insulin per Carb Count. Pt ate late dinner, had a long nap this afternoon. PRN Tylenol for headache, effective. Good appetite. Own CPAP for nap and at bedtime. Continued on Heparin drip. Bolus was given, dose increased per protocol.

## 2021-10-17 NOTE — PLAN OF CARE
Problem: Adult Inpatient Plan of Care  Goal: Optimal Comfort and Wellbeing  Outcome: Improving     Problem: Risk for Delirium  Goal: Improved Attention and Thought Clarity  Outcome: Improving     Problem: Adult Inpatient Plan of Care  Goal: Absence of Hospital-Acquired Illness or Injury  Outcome: Improving  Intervention: Identify and Manage Fall Risk  Recent Flowsheet Documentation  Taken 10/17/2021 1400 by Michel Cervantes, RN  Safety Promotion/Fall Prevention: nonskid shoes/slippers when out of bed   Pt had an emergency angiogram this AM. No intervention necessary. Nosigns and symptoms of hematoma noted following TR band removal. Pt passed reverse barbeau test. Pt currently laying supine in bed.   Michel Cervantes RN  10/17/2021  2:53 PM

## 2021-10-17 NOTE — PROGRESS NOTES
Care Management Initial Consult    General Information  Assessment completed with: Patient,    Type of CM/SW Visit: Initial Assessment    Primary Care Provider verified and updated as needed: Yes   Readmission within the last 30 days: no previous admission in last 30 days              Communication Assessment  Patient's communication style: spoken language (English or Bilingual)    Hearing Difficulty or Deaf: no   Wear Glasses or Blind: yes    Cognitive  Cognitive/Neuro/Behavioral: WDL                      Living Environment:   People in home: child(terri), adult     Current living Arrangements: house      Able to return to prior arrangements: yes       Family/Social Support:  Care provided by: self  Provides care for: no one  Marital Status:   Children          Description of Support System: Supportive, Involved    Support Assessment: Adequate family and caregiver support    Current Resources:   Patient receiving home care services: No     Community Resources: None  Equipment currently used at home: cane, straight  Supplies currently used at home: None    Employment/Financial:  Employment Status: retired        Financial Concerns: No concerns identified   Referral to Financial Counselor: No       Lifestyle & Psychosocial Needs:  Social Determinants of Health     Tobacco Use:      Smoking Tobacco Use:      Smokeless Tobacco Use:    Alcohol Use:      Frequency of Alcohol Consumption:      Average Number of Drinks:      Frequency of Binge Drinking:    Financial Resource Strain:      Difficulty of Paying Living Expenses:    Food Insecurity:      Worried About Running Out of Food in the Last Year:      Ran Out of Food in the Last Year:    Transportation Needs:      Lack of Transportation (Medical):      Lack of Transportation (Non-Medical):    Physical Activity:      Days of Exercise per Week:      Minutes of Exercise per Session:    Stress:      Feeling of Stress :    Social Connections:      Frequency of  Communication with Friends and Family:      Frequency of Social Gatherings with Friends and Family:      Attends [employer] Services:      Active Member of Clubs or Organizations:      Attends Club or Organization Meetings:      Marital Status:    Intimate Partner Violence:      Fear of Current or Ex-Partner:      Emotionally Abused:      Physically Abused:      Sexually Abused:    Depression:      PHQ-2 Score:    Housing Stability:      Unable to Pay for Housing in the Last Year:      Number of Places Lived in the Last Year:      Unstable Housing in the Last Year:        Functional Status:  Prior to admission patient needed assistance:   Dependent ADLs:: Independent  Dependent IADLs:: Independent       Mental Health Status:  Mental Health Status: No Current Concerns       Chemical Dependency Status:  Chemical Dependency Status: No Current Concerns             Values/Beliefs:  Spiritual, Cultural Beliefs, Roman [employer] Practices, Values that affect care: no               Additional Information:    SWCM introduced self and CM services to Pt.  Pt lives in a private residence with her daughter who provides support to her.  Pt is independent at baseline, drives, is retires, uses a cane, and confirms PCP.  No CM needs anticipated at this time.  CM informed Pt that CM will follow care progression and follow up on any recommendations from interdisciplinary team.  Pt states understanding.  Daughter will transport at discharge.     KATHY Becker

## 2021-10-17 NOTE — PROGRESS NOTES
Progress Note    Assessment/Plan  71-year-old female with past medical history of hypertension, hiatal hernia, hyperlipidemia, type II DM, major depressive disorder with anxiety presents with burning epigastric abdominal pain and found to have elevated troponin.  Currently patient is pain-free on a heparin drip.  Cardiology is planning to do angiogram on Monday.    Non-STEMI  --Currently on aspirin and IV heparin drip  --No epigastric pain after GI cocktail  --Echocardiogram reviewed and does not show any wall motion abnormality  --Currently n.p.o. for angiogram  Troponins are trending down  --Hemoglobin is stable    Insulin-dependent diabetes  --Controlled  --Continue 15 units of Lantus.  Order NovoLog 1 unit for 15 g of carbohydrate  --Continue sliding scale insulin  --Currently n.p.o. for angiogram-Continue to hold Metformin and glipizide    Headache non-intractable  --Patient not on nitroglycerin  --Improved with Tylenol PRN for headache.     Incidental pulmonary nodule  --0.5 cm in the lingula  --Follow-up in the pulmonary clinic in in 3 months    NICHOLE  --Continue CPAP    Morbid obesity BMI greater than 35    Epigastric pain/GERD/hiatal hernia  --Continue IV PPI  --Change to p.o. Protonix at discharge      Barriers to discharge: Coronary angiogram    Anticipated discharge date: 10/18        Subjective    Blood sugars are controlled.  Patient gone for angiogram.  Echocardiogram reviewed and does not show any wall motion abnormality.  Review of systems cannot be obtained    Objective    BP (!) 150/66 (BP Location: Left arm)   Pulse 80   Temp 98.7  F (37.1  C) (Axillary)   Resp 18   Wt 110.9 kg (244 lb 9.6 oz)   SpO2 93%   BMI 41.99 kg/m    Weight:   Wt Readings from Last 5 Encounters:   10/17/21 110.9 kg (244 lb 9.6 oz)       I/O last 3 completed shifts:  In: 359 [P.O.:240; I.V.:119]  Out: 250 [Urine:250]  I/O this shift:  In: 360 [P.O.:360]  Out: 400 [Urine:400]          Physical Exam  Patient is not in the  room  Pertinent Labs  ----------------------  Recent Labs   Lab 10/17/21  0757 10/17/21  0529 10/17/21  0036 10/16/21  1151 10/16/21  0949 10/16/21  0114 10/15/21  2124 10/15/21  1704   0000   NA  --   --   --   --  139  --   --  139  --    POTASSIUM  --   --   --   --  4.1  --   --  4.5  --    CO2  --   --   --   --  25  --   --  25  --    BUN  --   --   --   --  11  --   --  13  --    CR  --   --   --   --  0.85  --   --  0.79  --    MAG  --   --   --   --   --   --  1.5*  --   --    * 158* 151*   < > 168*   < >  --  162*   < >   ALBUMIN  --   --   --   --  3.5  --   --  3.8  --    BILITOTAL  --   --   --   --  0.5  --   --  0.4  --    ALKPHOS  --   --   --   --  64  --   --  70  --    ALT  --   --   --   --  20  --   --  20  --    AST  --   --   --   --  18  --   --  18  --     < > = values in this interval not displayed.     Recent Labs   Lab 10/16/21  0949 10/15/21  2252 10/15/21  1704   WBC 8.0 9.0 8.8   HGB 11.5* 11.5* 12.4   HCT 36.0 34.8* 38.6    300 329     No results for input(s): INR in the last 168 hours.  Glucose Values Latest Ref Rng & Units 10/15/2021 10/16/2021   Bedside Glucose (mg/dl )  - -- --   GLUCOSE 70 - 125 mg/dL 162(H) 168(H)   Some recent data might be hidden         Pertinent Radiology   Radiology Results: Personally reviewed impression/s  CT Chest/Abdomen/Pelvis w Contrast    Result Date: 10/15/2021  EXAM: CT CHEST/ABDOMEN/PELVIS W CONTRAST LOCATION: [facility] DATE/TIME: 10/15/2021 6:36 PM INDICATION: Abdominal pain and vomiting. Hiatal hernia. COMPARISON: None. TECHNIQUE: CT scan of the chest, abdomen, and pelvis was performed following injection of IV contrast. Multiplanar reformats were obtained. Dose reduction techniques were used. CONTRAST: [id number] 100ml FINDINGS: LUNGS AND PLEURA: Calcified granuloma. No pleural effusion. A 0.5 cm nodular opacity in the base of the lingula, image 76:3. MEDIASTINUM/AXILLAE: Moderate hiatal hernia with  approximately half of the stomach in the chest. No lymphadenopathy. CORONARY ARTERY CALCIFICATION: Moderate. HEPATOBILIARY: A subcentimeter hypodensity in the left lobe of the liver which is too small to characterize. PANCREAS: Normal. SPLEEN: Normal. ADRENAL GLANDS: Normal. KIDNEYS/BLADDER: Normal. BOWEL: No obstruction or inflammatory change. Normal appendix. LYMPH NODES: Normal. VASCULATURE: Mild atherosclerosis. PELVIC ORGANS: Heterogeneous uterus with multiple calcified fibroids. MUSCULOSKELETAL: Degenerative changes of the spine and hips.     IMPRESSION: 1.  Moderate hiatal hernia. 2.  A 0.5 cm pulmonary nodule in the lingula. See guidelines below. REFERENCE: Guidelines for Management of Incidental Pulmonary Nodules Detected on CT Images: From the Fleischner Society 2017. Guidelines apply to incidental nodules in patients who are 35 years or older. Guidelines do not apply to lung cancer screening, patients with immunosuppression, or patients with known primary cancer. SINGLE NODULE Nodule size <6 mm Low-risk patients: No follow-up needed. High-risk patients: Optional follow-up at 12 months.     Chest XR,  PA & LAT    Result Date: 10/15/2021  EXAM: XR CHEST 2 VW LOCATION: [facility] DATE/TIME: 10/15/2021 10:00 PM INDICATION: chest pain COMPARISON: CT chest exam 10/15/2021     IMPRESSION: Heart size and pulmonary vascularity normal. Large esophageal hiatal hernia. The lungs are clear.    EKG Results: not reviewed.

## 2021-10-17 NOTE — PROGRESS NOTES
HEART CARE CONSULTATON NOTE        Assessment/Recommendations   Assessment:  1.  Non-ST elevation myocardial infarction: Significant risk factors with elevated troponin and discomfort in the lower chest epigastric area.  Atypical features however given troponin elevation and risk factors would recommend further evaluation with coronary angiography.  She is in agreement with this plan.  Patient on the schedule today.  Denies any further chest discomfort.  2.  Morbid obesity  3.  Hiatal hernia  4.  Diabetes mellitus type 2 insulin-dependent: Poorly controlled  5.  NICHOLE on CPAP  6.  Hypertension  Plan:  1.  N.p.o. for coronary angiogram with possible intervention today  2.  Continue on aspirin and heparin drip  3.   Patient should be on higher dose statin therapy given diabetes.  Increase Lipitor to 80 mg daily from 20.       History of Present Illness/Subjective    No further chest discomfort.  Denies breathing difficulty    Echocardiogram 10/16/2021  Interpretation Summary  Left ventricular function is normal.The ejection fraction is 60-65%.  Normal right ventricle size and systolic function.  No significant valvualr abnormalities noted\  Technically difficult study     Physical Examination  Review of Systems   VITALS: BP (!) 150/66 (BP Location: Left arm)   Pulse 80   Temp 98.7  F (37.1  C) (Axillary)   Resp 18   Wt 110.9 kg (244 lb 9.6 oz)   SpO2 93%   BMI 41.99 kg/m    BMI: Body mass index is 41.99 kg/m .  Wt Readings from Last 3 Encounters:   10/17/21 110.9 kg (244 lb 9.6 oz)       Intake/Output Summary (Last 24 hours) at 10/17/2021 1123  Last data filed at 10/17/2021 1042  Gross per 24 hour   Intake 719 ml   Output 650 ml   Net 69 ml     General Appearance:   no distress, normal body habitus   ENT/Mouth: membranes moist, no oral lesions or bleeding gums.      EYES:  no scleral icterus, normal conjunctivae   Neck: no carotid bruits or thyromegaly   Chest/Lungs:   lungs are clear to auscultation    Cardiovascular:   Regular. Normal first and second heart sounds with no murmurs mild edema bilaterally    Abdomen:  no organomegaly, masses, bruits, or tenderness; bowel sounds are present   Extremities: no cyanosis or clubbing   Skin: no xanthelasma, warm.    Neurologic: normal  bilateral, no tremors     Psychiatric: alert and oriented x3, calm     Review Of Systems  Skin: negative  Eyes: negative  Ears/Nose/Throat: negative  Respiratory:   CarDyspnea on exertion  Gastrointestinal: negative  Genitourinary: negative  Musculoskeletal: negative  Neurologic: negative  Psychiatric: negative  Hematologic/Lymphatic/Immunologic: negative  Endocrine: negative          Lab Results    Chemistry/lipid CBC Cardiac Enzymes/BNP/TSH/INR   No results for input(s): CHOL, HDL, LDL, TRIG, CHOLHDLRATIO in the last 86824 hours.  No results for input(s): LDL in the last 98123 hours.  Recent Labs   Lab Test 10/17/21  0757 10/16/21  1151 10/16/21  0949   NA  --   --  139   POTASSIUM  --   --  4.1   CHLORIDE  --   --  102   CO2  --   --  25   *   < > 168*   BUN  --   --  11   CR  --   --  0.85   GFRESTIMATED  --   --  70   GRECIA  --   --  9.5    < > = values in this interval not displayed.     Recent Labs   Lab Test 10/16/21  0949 10/15/21  1704   CR 0.85 0.79     Recent Labs   Lab Test 10/15/21  2252   A1C 8.6*          Recent Labs   Lab Test 10/16/21  0949   WBC 8.0   HGB 11.5*   HCT 36.0   MCV 91        Recent Labs   Lab Test 10/16/21  0949 10/15/21  2252 10/15/21  1704   HGB 11.5* 11.5* 12.4    Recent Labs   Lab Test 10/16/21  1654 10/16/21  0949 10/15/21  2124   TROPONINI 0.16 0.16 0.34*     No results for input(s): BNP, NTBNPI, NTBNP in the last 96356 hours.  No results for input(s): TSH in the last 37716 hours.  No results for input(s): INR in the last 25948 hours.     Medical History  Surgical History Family History Social History   Diabetes mellitus type 2  Hypertension  Dyslipidemia Past Surgical History:    Procedure Laterality Date     BIOPSY BREAST Right 2007     BREAST EXCISIONAL BIOPSY Right 2007     IR LUMBAR EPIDURAL STEROID INJECTION  12/12/2003     IR LUMBAR EPIDURAL STEROID INJECTION  5/21/2004     IR LUMBAR EPIDURAL STEROID INJECTION  6/5/2006     IR LUMBAR EPIDURAL STEROID INJECTION  6/21/2006     IR LUMBAR EPIDURAL STEROID INJECTION  8/15/2008     Family History   Problem Relation Age of Onset     Lung Cancer Father      Lung Cancer Brother      Breast Cancer No family hx of         Social History     Socioeconomic History     Marital status:      Spouse name: Not on file     Number of children: Not on file     Years of education: Not on file     Highest education level: Not on file   Occupational History     Not on file   Tobacco Use     Smoking status: Not on file   Substance and Sexual Activity     Alcohol use: Not on file     Drug use: Not on file     Sexual activity: Not on file   Other Topics Concern     Not on file   Social History Narrative     Not on file     Social Determinants of Health     Financial Resource Strain:      Difficulty of Paying Living Expenses:    Food Insecurity:      Worried About Running Out of Food in the Last Year:      Ran Out of Food in the Last Year:    Transportation Needs:      Lack of Transportation (Medical):      Lack of Transportation (Non-Medical):    Physical Activity:      Days of Exercise per Week:      Minutes of Exercise per Session:    Stress:      Feeling of Stress :    Social Connections:      Frequency of Communication with Friends and Family:      Frequency of Social Gatherings with Friends and Family:      Attends [employer] Services:      Active Member of Clubs or Organizations:      Attends Club or Organization Meetings:      Marital Status:    Intimate Partner Violence:      Fear of Current or Ex-Partner:      Emotionally Abused:      Physically Abused:      Sexually Abused:          Medications  Allergies   No current outpatient medications on  file.      No Known Allergies      Priya Sanderson MD

## 2021-10-18 VITALS
WEIGHT: 254.4 LBS | RESPIRATION RATE: 18 BRPM | DIASTOLIC BLOOD PRESSURE: 60 MMHG | TEMPERATURE: 98.4 F | OXYGEN SATURATION: 94 % | HEART RATE: 80 BPM | BODY MASS INDEX: 43.67 KG/M2 | SYSTOLIC BLOOD PRESSURE: 133 MMHG

## 2021-10-18 LAB
ANION GAP SERPL CALCULATED.3IONS-SCNC: 10 MMOL/L (ref 5–18)
BUN SERPL-MCNC: 13 MG/DL (ref 8–28)
CALCIUM SERPL-MCNC: 8.7 MG/DL (ref 8.5–10.5)
CHLORIDE BLD-SCNC: 104 MMOL/L (ref 98–107)
CO2 SERPL-SCNC: 25 MMOL/L (ref 22–31)
CREAT SERPL-MCNC: 0.82 MG/DL (ref 0.6–1.1)
ERYTHROCYTE [DISTWIDTH] IN BLOOD BY AUTOMATED COUNT: 14.6 % (ref 10–15)
GFR SERPL CREATININE-BSD FRML MDRD: 73 ML/MIN/1.73M2
GLUCOSE BLD-MCNC: 155 MG/DL (ref 70–125)
GLUCOSE BLDC GLUCOMTR-MCNC: 153 MG/DL (ref 70–99)
GLUCOSE BLDC GLUCOMTR-MCNC: 162 MG/DL (ref 70–99)
HCT VFR BLD AUTO: 31.8 % (ref 35–47)
HGB BLD-MCNC: 10.1 G/DL (ref 11.7–15.7)
MCH RBC QN AUTO: 28.6 PG (ref 26.5–33)
MCHC RBC AUTO-ENTMCNC: 31.8 G/DL (ref 31.5–36.5)
MCV RBC AUTO: 90 FL (ref 78–100)
PLATELET # BLD AUTO: 276 10E3/UL (ref 150–450)
POTASSIUM BLD-SCNC: 4.4 MMOL/L (ref 3.5–5)
RBC # BLD AUTO: 3.53 10E6/UL (ref 3.8–5.2)
SODIUM SERPL-SCNC: 139 MMOL/L (ref 136–145)
TROPONIN I SERPL-MCNC: 0.07 NG/ML (ref 0–0.29)
WBC # BLD AUTO: 7.5 10E3/UL (ref 4–11)

## 2021-10-18 PROCEDURE — 99239 HOSP IP/OBS DSCHRG MGMT >30: CPT | Performed by: INTERNAL MEDICINE

## 2021-10-18 PROCEDURE — 99232 SBSQ HOSP IP/OBS MODERATE 35: CPT | Performed by: INTERNAL MEDICINE

## 2021-10-18 PROCEDURE — 85014 HEMATOCRIT: CPT | Performed by: INTERNAL MEDICINE

## 2021-10-18 PROCEDURE — 250N000013 HC RX MED GY IP 250 OP 250 PS 637: Performed by: INTERNAL MEDICINE

## 2021-10-18 PROCEDURE — 80048 BASIC METABOLIC PNL TOTAL CA: CPT | Performed by: INTERNAL MEDICINE

## 2021-10-18 PROCEDURE — 84484 ASSAY OF TROPONIN QUANT: CPT | Performed by: INTERNAL MEDICINE

## 2021-10-18 PROCEDURE — 36415 COLL VENOUS BLD VENIPUNCTURE: CPT | Performed by: INTERNAL MEDICINE

## 2021-10-18 RX ORDER — ATORVASTATIN CALCIUM 80 MG/1
80 TABLET, FILM COATED ORAL DAILY
Qty: 90 TABLET | Refills: 3 | Status: SHIPPED | OUTPATIENT
Start: 2021-10-18 | End: 2022-09-23

## 2021-10-18 RX ORDER — PANTOPRAZOLE SODIUM 40 MG/1
40 TABLET, DELAYED RELEASE ORAL
Qty: 30 TABLET | Refills: 0 | Status: SHIPPED | OUTPATIENT
Start: 2021-10-19

## 2021-10-18 RX ORDER — LISINOPRIL 5 MG/1
10 TABLET ORAL DAILY
Status: DISCONTINUED | OUTPATIENT
Start: 2021-10-19 | End: 2021-10-18 | Stop reason: HOSPADM

## 2021-10-18 RX ORDER — LISINOPRIL 10 MG/1
10 TABLET ORAL DAILY
Qty: 30 TABLET | Refills: 1 | Status: SHIPPED | OUTPATIENT
Start: 2021-10-19

## 2021-10-18 RX ADMIN — SERTRALINE HYDROCHLORIDE 200 MG: 50 TABLET ORAL at 09:00

## 2021-10-18 RX ADMIN — CLONAZEPAM 1 MG: 1 TABLET ORAL at 00:26

## 2021-10-18 RX ADMIN — INSULIN ASPART 3 UNITS: 100 INJECTION, SOLUTION INTRAVENOUS; SUBCUTANEOUS at 14:24

## 2021-10-18 RX ADMIN — PANTOPRAZOLE SODIUM 40 MG: 20 TABLET, DELAYED RELEASE ORAL at 07:26

## 2021-10-18 RX ADMIN — INSULIN ASPART 2 UNITS: 100 INJECTION, SOLUTION INTRAVENOUS; SUBCUTANEOUS at 08:53

## 2021-10-18 RX ADMIN — ASPIRIN 81 MG: 81 TABLET, COATED ORAL at 08:59

## 2021-10-18 RX ADMIN — BUSPIRONE HYDROCHLORIDE 7.5 MG: 7.5 TABLET ORAL at 08:59

## 2021-10-18 RX ADMIN — LISINOPRIL 5 MG: 5 TABLET ORAL at 08:59

## 2021-10-18 ASSESSMENT — ACTIVITIES OF DAILY LIVING (ADL)
ADLS_ACUITY_SCORE: 13

## 2021-10-18 NOTE — PROVIDER NOTIFICATION
Checked in with patient to see if she needed assistance with interface placement. Stated she's independent with system--no assistance needed. Encouraged patient to reach out should she ever need help.     10/17/21 2055   Home O2/Equipment Set-Up   Home O2 Device CPAP   Pt Owned Device Yes;Clean;Functional;Pt. Demonstrates Use     BP (!) 143/65 (BP Location: Left arm)   Pulse 87   Temp 98  F (36.7  C) (Oral)   Resp 20   Wt 110.9 kg (244 lb 9.6 oz)   SpO2 93%   BMI 41.99 kg/m       Ab Mckeon, RRT

## 2021-10-18 NOTE — PLAN OF CARE
Py doing well, right wrist angio site WDL. Denied pain, nausea, dyspnea, or lightheadedness. Up to BR independently. VSS. Continue to monitor.

## 2021-10-18 NOTE — PLAN OF CARE
Problem: Adult Inpatient Plan of Care  Goal: Plan of Care Review  Outcome: Adequate for Discharge  Goal: Patient-Specific Goal (Individualized)  Outcome: Adequate for Discharge  Goal: Absence of Hospital-Acquired Illness or Injury  Outcome: Adequate for Discharge  Intervention: Identify and Manage Fall Risk  Recent Flowsheet Documentation  Taken 10/18/2021 1529 by Myah Coronado RN  Safety Promotion/Fall Prevention: activity supervised  Taken 10/18/2021 0930 by Myah Coronado RN  Safety Promotion/Fall Prevention: activity supervised  Intervention: Prevent Skin Injury  Recent Flowsheet Documentation  Taken 10/18/2021 0730 by Myah Coronado RN  Body Position:   position changed independently   turned   right   left  Goal: Optimal Comfort and Wellbeing  Outcome: Adequate for Discharge  Goal: Readiness for Transition of Care  Outcome: Adequate for Discharge     Problem: Risk for Delirium  Goal: Optimal Coping  10/18/2021 1602 by Myah Coronado RN  Outcome: Adequate for Discharge  10/18/2021 1100 by Myah Coronado RN  Outcome: Improving  Goal: Improved Behavioral Control  Outcome: Adequate for Discharge  Goal: Improved Attention and Thought Clarity  Outcome: Adequate for Discharge  Goal: Improved Sleep  Outcome: Adequate for Discharge     Problem: Hyperglycemia  Goal: Blood Glucose Level Within Targeted Range  10/18/2021 1602 by Myah Coronado RN  Outcome: Adequate for Discharge  10/18/2021 1100 by Myah Coronado RN  Outcome: Improving     Problem: Arrhythmia/Dysrhythmia (Cardiac Catheterization)  Goal: Stable Heart Rate and Rhythm  10/18/2021 1602 by Myah Coronado RN  Outcome: Adequate for Discharge  10/18/2021 1100 by Myah Coronado RN  Outcome: Improving     Problem: Bleeding (Cardiac Catheterization)  Goal: Absence of Bleeding  Outcome: Adequate for Discharge     Problem: Contrast-Induced Injury Risk (Cardiac Catheterization)  Goal: Absence of Contrast-Induced Injury  Outcome: Adequate for Discharge     Problem: Embolism  (Cardiac Catheterization)  Goal: Absence of Embolism Signs and Symptoms  Outcome: Adequate for Discharge     Problem: Ongoing Anesthesia/Sedation Effects (Cardiac Catheterization)  Goal: Anesthesia/Sedation Recovery  Outcome: Adequate for Discharge  Intervention: Optimize Anesthesia Recovery  Recent Flowsheet Documentation  Taken 10/18/2021 1529 by Myah Coronado RN  Safety Promotion/Fall Prevention: activity supervised  Taken 10/18/2021 0930 by Myah Coronado RN  Safety Promotion/Fall Prevention: activity supervised     Problem: Pain (Cardiac Catheterization)  Goal: Acceptable Pain Control  Outcome: Adequate for Discharge  Intervention: Prevent or Manage Pain  Recent Flowsheet Documentation  Taken 10/18/2021 0730 by Myah Coronado RN  Pain Management Interventions: (elevated shoulder with 2 pillows)   back rub   breathing exercises   cold applied     Problem: Vascular Access Protection (Cardiac Catheterization)  Goal: Absence of Vascular Access Complication  Outcome: Adequate for Discharge  Intervention: Prevent Access Site Complications  Recent Flowsheet Documentation  Taken 10/18/2021 0730 by Myah Coronado RN  Activity Management: activity encouraged  Head of Bed (HOB) Positioning: HOB at 30-45 degrees     Problem: Cardiac Output Decreased  Goal: Effective Cardiac Output  10/18/2021 1602 by Myah Coronado RN  Outcome: Adequate for Discharge  10/18/2021 1100 by Myah Coronado RN  Outcome: Improving  Intervention: Optimize Cardiac Output  Recent Flowsheet Documentation  Taken 10/18/2021 0730 by Myah Coronado RN  Head of Bed (HOB) Positioning: HOB at 30-45 degrees     Problem: Cardiac Output Decreased  Goal: Effective Cardiac Output  10/18/2021 1602 by Myah Coronado RN  Outcome: Adequate for Discharge  10/18/2021 1100 by Myah Coronado RN  Outcome: Improving  Intervention: Optimize Cardiac Output  Recent Flowsheet Documentation  Taken 10/18/2021 0730 by Myah Coronado RN  Head of Bed (HOB) Positioning: HOB at 30-45  degrees     Problem: Vascular Access Protection (Cardiac Catheterization)  Goal: Absence of Vascular Access Complication  Outcome: Adequate for Discharge  Intervention: Prevent Access Site Complications  Recent Flowsheet Documentation  Taken 10/18/2021 4163 by Myah Coronado RN  Activity Management: activity encouraged  Head of Bed (HOB) Positioning: HOB at 30-45 degrees     Problem: Bleeding (Cardiac Catheterization)  Goal: Absence of Bleeding  Outcome: Adequate for Discharge

## 2021-10-18 NOTE — PLAN OF CARE
Problem: Hyperglycemia  Goal: Blood Glucose Level Within Targeted Range  Outcome: Improving     Problem: Arrhythmia/Dysrhythmia (Cardiac Catheterization)  Goal: Stable Heart Rate and Rhythm  Outcome: Improving     Problem: Bleeding (Cardiac Catheterization)  Goal: Absence of Bleeding  Outcome: Improving     Tele Rhythm- Sinus Rhythm with First degree AVB, rate 90's. Right radial puncture site has no bleeding, no hematoma. Up in the chair for meals, ambulating in the room and into the bathroom. IV Nacl dc'ed per parameter. Voiding. BG- 134 and 188. (Initial HS BG was 261, taken too closed after pt ate her meals, not covered and Glucose was rechecked). Sliding scale insulin + Carb Insulin with meals were given.

## 2021-10-18 NOTE — PROGRESS NOTES
Cardiology Progress Note    Assessment/Plan:    1. Elevated troponins, likely type II myocardial infarction (not associated with acute coronary syndrome), given paucity of coronary disease at angiography.  Noncardiac etiology suspected.  2. Obstructive sleep apnea on CPAP  3. Mild coronary artery disease  4. Essential hypertension with fair control..  Suggest discharge on increased lisinopril 10 mg daily.    Follow-up with cardiology as needed only    Active Problems:    Hiatal hernia    Pulmonary nodule    Upper abdominal pain    Elevated troponin     LOS: 2 days     Subjective:  Feels well.  No further abdominal discomfort.  No further emesis.  Ambulating in room without a problem.      Objective:   Vital signs in last 24 hours:  Vitals:    10/17/21 2354 10/18/21 0346 10/18/21 0803 10/18/21 1117   BP: 133/62 (!) 143/65 (!) 142/62 133/60   BP Location: Left arm Left arm Left arm Left arm   Pulse: 87 87 81 80   Resp: 20 20 18 18   Temp: 97.9  F (36.6  C) 98  F (36.7  C) 98.2  F (36.8  C) 98.4  F (36.9  C)   TempSrc: Oral Oral Oral Oral   SpO2: 95% 93% 96% 94%   Weight:  115.4 kg (254 lb 6.4 oz)       Weight:   Wt Readings from Last 3 Encounters:   10/18/21 115.4 kg (254 lb 6.4 oz)           PHYSICAL EXAM      Respiratory:  Normal breath sounds, No respiratory distress, No wheezing, No chest tenderness.   Cardiovascular: Normal heart rate, Normal rhythm, No murmurs, No rubs, No gallops.   GI: Obese.  Bowel sounds normal, Soft, No tenderness, No masses  Extremities: Thick.  No edema       Cardiographics:   Telemetry sinus rhythm, 80 to 100 bpm.    Coronary angiogram 10/17/2021:    1st Mrg lesion is 25% stenosed.    Prox LAD lesion is 30% stenosed.    Prox RCA to Mid RCA lesion is 25% stenosed.    Dist RCA lesion is 35% stenosed.      Imaging:   EXAM: XR CHEST 2 VW  LOCATION: [facility]  DATE/TIME: 10/15/2021 10:00 PM  INDICATION: chest pain  COMPARISON: CT chest exam 10/15/2021                                                           IMPRESSION: Heart size and pulmonary vascularity normal. Large esophageal hiatal hernia. The lungs are clear.        Lab Results:   Lab Results   Component Value Date    WBC 7.5 10/18/2021    HGB 10.1 (L) 10/18/2021    HCT 31.8 (L) 10/18/2021     10/18/2021    ALT 20 10/16/2021    AST 18 10/16/2021     10/18/2021    BUN 13 10/18/2021    CO2 25 10/18/2021     Lab Results   Component Value Date    TROPONINI 0.07 10/18/2021         Trae Rosario MD [facility]  10/18/2021

## 2021-10-18 NOTE — PLAN OF CARE
Problem: Risk for Delirium  Goal: Optimal Coping  Outcome: Improving     Problem: Hyperglycemia  Goal: Blood Glucose Level Within Targeted Range  Outcome: Improving     Problem: Arrhythmia/Dysrhythmia (Cardiac Catheterization)  Goal: Stable Heart Rate and Rhythm  Outcome: Improving     Problem: Cardiac Output Decreased  Goal: Effective Cardiac Output  Outcome: Improving  Intervention: Optimize Cardiac Output  Recent Flowsheet Documentation  Taken 10/18/2021 0730 by Myah Coronado RN  Head of Bed (HOB) Positioning: HOB at 30-45 degrees     Problem: Adult Inpatient Plan of Care  Goal: Absence of Hospital-Acquired Illness or Injury  Intervention: Identify and Manage Fall Risk  Recent Flowsheet Documentation  Taken 10/18/2021 0930 by Myah Coronado RN  Safety Promotion/Fall Prevention: activity supervised  Intervention: Prevent Skin Injury  Recent Flowsheet Documentation  Taken 10/18/2021 0730 by Myah Coronado RN  Body Position:    position changed independently    turned    right    left     Problem: Ongoing Anesthesia/Sedation Effects (Cardiac Catheterization)  Goal: Anesthesia/Sedation Recovery  Intervention: Optimize Anesthesia Recovery  Recent Flowsheet Documentation  Taken 10/18/2021 0930 by Myah Coronado RN  Safety Promotion/Fall Prevention: activity supervised     Problem: Pain (Cardiac Catheterization)  Goal: Acceptable Pain Control  Intervention: Prevent or Manage Pain  Recent Flowsheet Documentation  Taken 10/18/2021 0730 by Myah Coronado RN  Pain Management Interventions: (elevated shoulder with 2 pillows)    back rub    breathing exercises    cold applied     Problem: Vascular Access Protection (Cardiac Catheterization)  Goal: Absence of Vascular Access Complication  Intervention: Prevent Access Site Complications  Recent Flowsheet Documentation  Taken 10/18/2021 0730 by Myah Coronado RN  Activity Management: activity encouraged  Head of Bed (HOB) Positioning: HOB at 30-45 degrees  PAIN left shoulder - back  rub done, elevated the left shoulder with 2 pillows, applied cold compress per pt's request. Offered warm packs she prefers to try cold compress. There after she stated relief.  Seen by Dr. Kaur with order diclofenac topical and apply heat/warm packs.   Discharged - Called for appointment with Linnea Noonan but  unable to find her in the list. Instructed pt to arrange for her appt within 7 days for hospital follow up.  AVS printed, explained & given to the patient. Patient left per wheelchair with her daughter.

## 2021-10-18 NOTE — DISCHARGE SUMMARY
[facility] MEDICINE  DISCHARGE SUMMARY     Primary Care Physician: Linnea Noonan  Admission Date: 10/15/2021   Discharge Provider: Abhinav Kaur MD Discharge Date: 10/18/2021   Diet: Diabetic   Code Status: Full Code   Activity: As tolerated        Condition at Discharge: Stable     REASON FOR PRESENTATION(See Admission Note for Details)   Epigastric pain    PRINCIPAL & ACTIVE DISCHARGE DIAGNOSES   Non-STEMI likely demand ischemia  Insulin-dependent diabetes  Headache  Incidental pulmonary nodule  NICHOLE  Morbid obesity  GERD    SIGNIFICANT FINDINGS (Imaging, labs):   CT Chest/Abdomen/Pelvis w Contrast    Result Date: 10/15/2021  EXAM: CT CHEST/ABDOMEN/PELVIS W CONTRAST LOCATION: [facility] DATE/TIME: 10/15/2021 6:36 PM INDICATION: Abdominal pain and vomiting. Hiatal hernia. COMPARISON: None. TECHNIQUE: CT scan of the chest, abdomen, and pelvis was performed following injection of IV contrast. Multiplanar reformats were obtained. Dose reduction techniques were used. CONTRAST: [id number] 100ml FINDINGS: LUNGS AND PLEURA: Calcified granuloma. No pleural effusion. A 0.5 cm nodular opacity in the base of the lingula, image 76:3. MEDIASTINUM/AXILLAE: Moderate hiatal hernia with approximately half of the stomach in the chest. No lymphadenopathy. CORONARY ARTERY CALCIFICATION: Moderate. HEPATOBILIARY: A subcentimeter hypodensity in the left lobe of the liver which is too small to characterize. PANCREAS: Normal. SPLEEN: Normal. ADRENAL GLANDS: Normal. KIDNEYS/BLADDER: Normal. BOWEL: No obstruction or inflammatory change. Normal appendix. LYMPH NODES: Normal. VASCULATURE: Mild atherosclerosis. PELVIC ORGANS: Heterogeneous uterus with multiple calcified fibroids. MUSCULOSKELETAL: Degenerative changes of the spine and hips.     IMPRESSION: 1.  Moderate hiatal hernia. 2.  A 0.5 cm pulmonary nodule in the lingula. See guidelines below. REFERENCE: Guidelines for  Management of Incidental Pulmonary Nodules Detected on CT Images: From the Fleischner Society 2017. Guidelines apply to incidental nodules in patients who are 35 years or older. Guidelines do not apply to lung cancer screening, patients with immunosuppression, or patients with known primary cancer. SINGLE NODULE Nodule size <6 mm Low-risk patients: No follow-up needed. High-risk patients: Optional follow-up at 12 months.     Chest XR,  PA & LAT    Result Date: 10/15/2021  EXAM: XR CHEST 2 VW [facility]ATION: [facility] DATE/TIME: 10/15/2021 10:00 PM INDICATION: chest pain COMPARISON: CT chest exam 10/15/2021     IMPRESSION: Heart size and pulmonary vascularity normal. Large esophageal hiatal hernia. The lungs are clear.    Echocardiogram Complete    Result Date: 10/16/2021  [id number]^MARVIN^[place] [address]  Name: YAJAIRA WADE MRN: [id number] : 1950 Study Date: 10/16/2021 10:47 AM Age: 71 yrs Gender: Female Patient Location: [facility] Reason For Study: Chest Pain Ordering Physician: YAMILETH WONG Performed By: TEVIN  BSA: 2.1 m2 Height: 64 in Weight: 245 lb HR: 90 BP: 129/61 mmHg ______________________________________________________________________________ Procedure Definity (NDC #37183-400) given intravenously. Technically difficult study. ______________________________________________________________________________ Interpretation Summary  Left ventricular function is normal.The ejection fraction is 60-65%. Normal right ventricle size and systolic function. No significant valvualr abnormalities noted\ Technically difficult study ______________________________________________________________________________ Left Ventricle Left ventricular function is normal.The ejection fraction is 60-65%.  Right Ventricle Normal right ventricle size and systolic function.  Atria Normal left atrial size. Right atrial size is  normal.  Mitral Valve Mitral valve leaflets appear normal. There is no evidence of mitral stenosis or clinically significant mitral regurgitation.  Tricuspid Valve Tricuspid valve leaflets appear normal. There is no evidence of tricuspid stenosis or clinically significant tricuspid regurgitation.  Aortic Valve Aortic valve leaflets appear normal. There is no evidence of aortic stenosis or clinically significant aortic regurgitation.  Pulmonic Valve The pulmonic valve is not well seen, but is grossly normal.  Vessels Normal size ascending aorta.  Pericardium There is no pericardial effusion.  ______________________________________________________________________________ MMode/2D Measurements & Calculations RVDd: 3.2 cm IVSd: 1.2 cm LVIDd: 3.4 cm LVIDs: 2.4 cm LVPWd: 1.1 cm FS: 27.8 %  LV mass(C)d: 118.6 grams LV mass(C)dI: 55.6 grams/m2 Ao root diam: 2.5 cm LA dimension: 3.0 cm asc Aorta Diam: 3.1 cm LA/Ao: 1.2 LVOT diam: 1.9 cm LVOT area: 2.7 cm2 LA Volume Indexed (AL/bp): 35.9 ml/m2 RWT: 0.65  Doppler Measurements & Calculations MV E max wayne: 94.0 cm/sec MV A max wayne: 119.7 cm/sec MV E/A: 0.79 MV max P.2 mmHg MV mean PG: 3.9 mmHg MV V2 VTI: 31.0 cm MVA(VTI): 2.2 cm2  MV dec slope: 635.7 cm/sec2 MV dec time: 0.15 sec Ao V2 max: 168.5 cm/sec Ao max P.0 mmHg Ao V2 mean: 97.3 cm/sec Ao mean P.0 mmHg Ao V2 VTI: 34.3 cm KSENIA(I,D): 2.0 cm2 KSENIA(V,D): 2.1 cm2 LV V1 max P.4 mmHg LV V1 max: 126.3 cm/sec LV V1 VTI: 24.7 cm SV(LVOT): 67.8 ml SI(LVOT): 31.8 ml/m2 PA acc time: 0.11 sec TR max wayne: 279.6 cm/sec TR max P.3 mmHg AV Wayne Ratio (DI): 0.75 KSENIA Index (cm2/m2): 0.93 E/E' av.4 Lateral E/e': 15.7 Medial E/e': 13.1  ______________________________________________________________________________ Report approved by: Veronica Solano 10/16/2021 07:04 PM         PENDING LABS         PROCEDURES ( this hospitalization only)      Procedure(s):  Coronary Angiogram    RECOMMENDATIONS TO OUTPATIENT  PROVIDER FOR F/U VISIT     Follow-up with PCP    DISPOSITION     Home    SUMMARY OF HOSPITAL COURSE:      71-year-old female with past medical history of hypertension, hiatal hernia, hyperlipidemia, type II DM, major depressive disorder with anxiety presents with burning epigastric abdominal pain and found to have elevated troponin.    Underwent angiogram on 10/17 which did not show any significant blockages    Non-STEMI  --Initially treated with aspirin and IV heparin but was discontinued after angiogram did not show any remarkable disease.  --No epigastric pain after GI cocktail  --Echocardiogram reviewed and does not show any wall motion abnormality  --Statin has been increased to 80 mg as per cardiology       Insulin-dependent diabetes  --Controlled  --Restart Metformin on 10/19    Headache non-intractable  --Patient not on nitroglycerin  --Improved with Tylenol PRN for headache.       Essential hypertension  --Cardiology recommended increase of lisinopril to 10 mg once daily    Incidental pulmonary nodule  --0.5 cm in the lingula  --Follow-up in the pulmonary clinic in in 3 months     NICHOLE  --Continue CPAP     Morbid obesity BMI greater than 35     Epigastric pain/GERD/hiatal hernia  --Continue IV PPI  --Change to p.o. Protonix at discharge    Discharge Medications with Med changes:        Review of your medicines      START taking      Dose / Directions   pantoprazole 40 MG EC tablet  Commonly known as: PROTONIX      Dose: 40 mg  Start taking on: October 19, 2021  Take 1 tablet (40 mg) by mouth every morning (before breakfast)  Quantity: 30 tablet  Refills: 0        CONTINUE these medicines which may have CHANGED, or have new prescriptions. If we are uncertain of the size of tablets/capsules you have at home, strength may be listed as something that might have changed.      Dose / Directions   atorvastatin 80 MG tablet  Commonly known as: LIPITOR  This may have changed:     medication strength    how much to  take    when to take this      Dose: 80 mg  Take 1 tablet (80 mg) by mouth daily  Quantity: 90 tablet  Refills: 3     lisinopril 10 MG tablet  Commonly known as: ZESTRIL  This may have changed:     medication strength    how much to take  Used for: Essential hypertension      Dose: 10 mg  Start taking on: October 19, 2021  Take 1 tablet (10 mg) by mouth daily  Quantity: 30 tablet  Refills: 1        CONTINUE these medicines which have NOT CHANGED      Dose / Directions   albuterol 108 (90 Base) MCG/ACT inhaler  Commonly known as: PROAIR HFA/PROVENTIL HFA/VENTOLIN HFA      Dose: 2 puff  Inhale 2 puffs into the lungs every 6 hours as needed for shortness of breath / dyspnea or wheezing  Refills: 0     aspirin 81 MG EC tablet      Dose: 81 mg  Take 81 mg by mouth daily  Refills: 0     busPIRone 7.5 MG tablet  Commonly known as: BUSPAR      Dose: 7.5 mg  Take 7.5 mg by mouth 2 times daily  Refills: 0     cetirizine 10 MG tablet  Commonly known as: zyrTEC      Dose: 10 mg  Take 10 mg by mouth daily  Refills: 0     clonazePAM 1 MG tablet  Commonly known as: klonoPIN      Dose: 1 mg  Take 1 mg by mouth 2 times daily as needed for anxiety  Refills: 0     cyclobenzaprine 10 MG tablet  Commonly known as: FLEXERIL      Dose: 10 mg  Take 10 mg by mouth 3 times daily as needed for muscle spasms  Refills: 0     famotidine 20 MG tablet  Commonly known as: PEPCID      Dose: 20 mg  Take 20 mg by mouth daily  Refills: 0     fluticasone 50 MCG/ACT nasal spray  Commonly known as: FLONASE      Dose: 1 spray  Spray 1 spray into both nostrils daily as needed for rhinitis or allergies  Refills: 0     glipiZIDE 10 MG 24 hr tablet  Commonly known as: GLUCOTROL XL      Dose: 20 mg  Take 20 mg by mouth daily  Refills: 0     insulin glargine 100 UNIT/ML pen  Commonly known as: LANTUS PEN      Dose: 22 Units  Inject 22 Units Subcutaneous At Bedtime  Refills: 0     metFORMIN 500 MG tablet  Commonly known as: GLUCOPHAGE  Used for: Type 2 diabetes  mellitus with diabetic neuropathy, with long-term current use of insulin (H)      Dose: 1,000 mg  Start taking on: October 19, 2021  Take 2 tablets (1,000 mg) by mouth 2 times daily (with meals)  Refills: 0     sertraline 100 MG tablet  Commonly known as: ZOLOFT      Dose: 200 mg  Take 200 mg by mouth daily  Refills: 0     traZODone 50 MG tablet  Commonly known as: DESYREL      Dose: 50 mg  Take 50 mg by mouth At Bedtime  Refills: 0           Where to get your medicines      These medications were sent to [id number] - [place], [place] - [address]  [address], [place] [address]    Phone: [phone]     atorvastatin 80 MG tablet    lisinopril 10 MG tablet    pantoprazole 40 MG EC tablet             Rationale for medication changes:      Statin has been increased to 80 mg per cardiology   PPI for epigastric pain  Lisinopril has been increased to 10 mg as per cardiology      Consults   Cardiology      Immunizations given this encounter     Immunization History   Administered Date(s) Administered     COVID-19,PF,Moderna 02/26/2021, 03/26/2021     Zoster vaccine, live 08/09/2016          Anticoagulation Information      Recent INR results: No lab results found.  Warfarin doses (if applicable) or name of other anticoagulant: Not applicable      Discharge Orders     Discharge Procedure Orders   Follow-up and recommended labs and tests    Order Comments: Follow up with primary care provider, Linnea Noonan, within 7 days for hospital follow- up.  No follow up labs or test are needed.  Follow-up with pulmonary nodule clinic in 3 months     Activity   Order Comments: Your activity upon discharge: activity as tolerated     Order Specific Question Answer Comments   Is discharge order? Yes      Reason for your hospital stay   Order Comments: Epigastric pain     Diet   Order Comments: Follow this diet upon discharge: Orders Placed This Encounter     Diabetic     Order  "Specific Question Answer Comments   Is discharge order? Yes      Examination     Vital Signs in last 24 hours:   Vital signs:  Temp: 98.4  F (36.9  C) Temp src: Oral BP: 133/60 Pulse: 80   Resp: 18 SpO2: 94 % O2 Device: None (Room air) Oxygen Delivery: 2 LPM   Weight: 115.4 kg (254 lb 6.4 oz)  Estimated body mass index is 43.67 kg/m  as calculated from the following:    Height as of 9/20/17: 1.626 m (5' 4\").    Weight as of this encounter: 115.4 kg (254 lb 6.4 oz).        General appearance: alert, appears stated age and cooperative       Please see EMR for more detailed significant labs, imaging, consultant notes etc.  Total time spent on discharge: 35 minutes    Abhinav Kaur MD   [facility]: Ph:[phone]    CC:Linnea Noonan    "

## 2021-10-18 NOTE — PROGRESS NOTES
Care Management Discharge Note    Discharge Date: 10/18/2021       Discharge Disposition: Home    Discharge Services:  None    Discharge DME: None    Discharge Transportation: family or friend will provide    Private pay costs discussed: Not aplicable    PAS Confirmation Code:  NA  Patient/family educated on Medicare website which has current facility and service quality ratings:  NA    Education Provided on the Discharge Plan:  Yes, per treatment team  Persons Notified of Discharge Plans: Patient  Patient/Family in Agreement with the Plan: yes    Handoff Referral Completed: Not applicable    Additional Information:    SWCM discussed updates with MD.  Pt discharging home with family.  No CM needs identified.  Pt will have support at home from Pt's daughter who she lives with.  Daughter transporting.         KATHY Becker

## 2021-12-04 ENCOUNTER — HEALTH MAINTENANCE LETTER (OUTPATIENT)
Age: 71
End: 2021-12-04

## 2022-01-29 ENCOUNTER — HEALTH MAINTENANCE LETTER (OUTPATIENT)
Age: 72
End: 2022-01-29

## 2022-05-16 ENCOUNTER — HEALTH MAINTENANCE LETTER (OUTPATIENT)
Age: 72
End: 2022-05-16

## 2022-07-10 ENCOUNTER — HEALTH MAINTENANCE LETTER (OUTPATIENT)
Age: 72
End: 2022-07-10

## 2022-09-11 ENCOUNTER — HEALTH MAINTENANCE LETTER (OUTPATIENT)
Age: 72
End: 2022-09-11

## 2022-09-22 DIAGNOSIS — R79.89 ELEVATED TROPONIN: ICD-10-CM

## 2022-09-23 RX ORDER — ATORVASTATIN CALCIUM 80 MG/1
TABLET, FILM COATED ORAL
Qty: 90 TABLET | Refills: 3 | Status: SHIPPED | OUTPATIENT
Start: 2022-09-23

## 2022-10-06 ENCOUNTER — ANCILLARY PROCEDURE (OUTPATIENT)
Dept: MAMMOGRAPHY | Facility: CLINIC | Age: 72
End: 2022-10-06
Attending: NURSE PRACTITIONER
Payer: MEDICARE

## 2022-10-06 DIAGNOSIS — Z12.31 VISIT FOR SCREENING MAMMOGRAM: ICD-10-CM

## 2022-10-06 PROCEDURE — 77067 SCR MAMMO BI INCL CAD: CPT

## 2023-01-23 ENCOUNTER — HEALTH MAINTENANCE LETTER (OUTPATIENT)
Age: 73
End: 2023-01-23

## 2023-05-06 ENCOUNTER — HEALTH MAINTENANCE LETTER (OUTPATIENT)
Age: 73
End: 2023-05-06

## 2023-07-29 ENCOUNTER — HEALTH MAINTENANCE LETTER (OUTPATIENT)
Age: 73
End: 2023-07-29

## 2023-10-07 ENCOUNTER — HEALTH MAINTENANCE LETTER (OUTPATIENT)
Age: 73
End: 2023-10-07

## 2023-10-17 ENCOUNTER — ANCILLARY PROCEDURE (OUTPATIENT)
Dept: MAMMOGRAPHY | Facility: CLINIC | Age: 73
End: 2023-10-17
Attending: NURSE PRACTITIONER
Payer: MEDICARE

## 2023-10-17 DIAGNOSIS — Z12.31 VISIT FOR SCREENING MAMMOGRAM: ICD-10-CM

## 2023-10-17 PROCEDURE — 77067 SCR MAMMO BI INCL CAD: CPT

## 2024-02-24 ENCOUNTER — HEALTH MAINTENANCE LETTER (OUTPATIENT)
Age: 74
End: 2024-02-24

## 2024-07-13 ENCOUNTER — HEALTH MAINTENANCE LETTER (OUTPATIENT)
Age: 74
End: 2024-07-13

## 2024-09-21 ENCOUNTER — HEALTH MAINTENANCE LETTER (OUTPATIENT)
Age: 74
End: 2024-09-21

## 2024-10-25 ENCOUNTER — ANCILLARY PROCEDURE (OUTPATIENT)
Dept: MAMMOGRAPHY | Facility: CLINIC | Age: 74
End: 2024-10-25
Attending: NURSE PRACTITIONER
Payer: MEDICARE

## 2024-10-25 DIAGNOSIS — Z12.31 VISIT FOR SCREENING MAMMOGRAM: ICD-10-CM

## 2024-10-25 PROCEDURE — 77063 BREAST TOMOSYNTHESIS BI: CPT

## 2025-04-05 ENCOUNTER — HEALTH MAINTENANCE LETTER (OUTPATIENT)
Age: 75
End: 2025-04-05

## 2025-08-17 ENCOUNTER — HOSPITAL ENCOUNTER (EMERGENCY)
Facility: HOSPITAL | Age: 75
Discharge: HOME | End: 2025-08-18
Attending: EMERGENCY MEDICINE | Admitting: EMERGENCY MEDICINE
Payer: MEDICARE

## 2025-08-17 DIAGNOSIS — N39.0 URINARY TRACT INFECTION IN FEMALE: Primary | ICD-10-CM

## 2025-08-17 LAB
ALBUMIN SERPL BCG-MCNC: 3.8 G/DL (ref 3.5–5.2)
ALBUMIN UR-MCNC: 10 MG/DL
ALP SERPL-CCNC: 58 U/L (ref 40–150)
ALT SERPL W P-5'-P-CCNC: 21 U/L (ref 0–50)
ANION GAP SERPL CALCULATED.3IONS-SCNC: 14 MMOL/L (ref 7–15)
APPEARANCE UR: ABNORMAL
AST SERPL W P-5'-P-CCNC: 29 U/L (ref 0–45)
BACTERIA #/AREA URNS HPF: ABNORMAL /HPF
BILIRUB SERPL-MCNC: 0.3 MG/DL
BILIRUB UR QL STRIP: NEGATIVE
BUN SERPL-MCNC: 14.1 MG/DL (ref 8–23)
CALCIUM SERPL-MCNC: 9.2 MG/DL (ref 8.8–10.4)
CHLORIDE SERPL-SCNC: 102 MMOL/L (ref 98–107)
COLOR UR AUTO: YELLOW
CREAT SERPL-MCNC: 0.75 MG/DL (ref 0.51–0.95)
EGFRCR SERPLBLD CKD-EPI 2021: 84 ML/MIN/1.73M2
ERYTHROCYTE [DISTWIDTH] IN BLOOD BY AUTOMATED COUNT: 13.5 % (ref 10–15)
GLUCOSE SERPL-MCNC: 137 MG/DL (ref 70–99)
GLUCOSE UR STRIP-MCNC: NEGATIVE MG/DL
HCO3 SERPL-SCNC: 22 MMOL/L (ref 22–29)
HCT VFR BLD AUTO: 37.3 % (ref 35–47)
HGB BLD-MCNC: 12.2 G/DL (ref 11.7–15.7)
HGB UR QL STRIP: ABNORMAL
HYALINE CASTS: 2 /LPF
KETONES UR STRIP-MCNC: ABNORMAL MG/DL
LACTATE SERPL-SCNC: 0.6 MMOL/L (ref 0.7–2)
LEUKOCYTE ESTERASE UR QL STRIP: ABNORMAL
MCH RBC QN AUTO: 29.5 PG (ref 26.5–33)
MCHC RBC AUTO-ENTMCNC: 32.7 G/DL (ref 31.5–36.5)
MCV RBC AUTO: 90.1 FL (ref 78–100)
MUCOUS THREADS #/AREA URNS LPF: PRESENT /LPF
NITRATE UR QL: NEGATIVE
PH UR STRIP: 5.5 [PH] (ref 5–7)
PLATELET # BLD AUTO: 226 10E3/UL (ref 150–450)
POTASSIUM SERPL-SCNC: 4.5 MMOL/L (ref 3.4–5.3)
PROT SERPL-MCNC: 6.7 G/DL (ref 6.4–8.3)
RBC # BLD AUTO: 4.14 10E6/UL (ref 3.8–5.2)
RBC URINE: 73 /HPF
SODIUM SERPL-SCNC: 138 MMOL/L (ref 135–145)
SP GR UR STRIP: 1.02 (ref 1–1.03)
SQUAMOUS EPITHELIAL: 1 /HPF
UROBILINOGEN UR STRIP-MCNC: NORMAL MG/DL
WBC # BLD AUTO: 6.7 10E3/UL (ref 4–11)
WBC CLUMPS #/AREA URNS HPF: PRESENT /HPF
WBC URINE: >182 /HPF

## 2025-08-17 PROCEDURE — 99284 EMERGENCY DEPT VISIT MOD MDM: CPT | Mod: 25

## 2025-08-17 PROCEDURE — 83605 ASSAY OF LACTIC ACID: CPT | Performed by: EMERGENCY MEDICINE

## 2025-08-17 PROCEDURE — 250N000011 HC RX IP 250 OP 636: Performed by: EMERGENCY MEDICINE

## 2025-08-17 PROCEDURE — 80053 COMPREHEN METABOLIC PANEL: CPT | Performed by: EMERGENCY MEDICINE

## 2025-08-17 PROCEDURE — 81001 URINALYSIS AUTO W/SCOPE: CPT | Performed by: EMERGENCY MEDICINE

## 2025-08-17 PROCEDURE — 85027 COMPLETE CBC AUTOMATED: CPT | Performed by: EMERGENCY MEDICINE

## 2025-08-17 PROCEDURE — 36415 COLL VENOUS BLD VENIPUNCTURE: CPT | Performed by: EMERGENCY MEDICINE

## 2025-08-17 PROCEDURE — 87086 URINE CULTURE/COLONY COUNT: CPT | Performed by: EMERGENCY MEDICINE

## 2025-08-17 PROCEDURE — 81003 URINALYSIS AUTO W/O SCOPE: CPT | Performed by: STUDENT IN AN ORGANIZED HEALTH CARE EDUCATION/TRAINING PROGRAM

## 2025-08-17 PROCEDURE — 96365 THER/PROPH/DIAG IV INF INIT: CPT

## 2025-08-17 RX ORDER — CEFTRIAXONE 2 G/1
2 INJECTION, POWDER, FOR SOLUTION INTRAMUSCULAR; INTRAVENOUS ONCE
Status: COMPLETED | OUTPATIENT
Start: 2025-08-17 | End: 2025-08-18

## 2025-08-17 RX ADMIN — CEFTRIAXONE SODIUM 2 G: 2 INJECTION, POWDER, FOR SOLUTION INTRAMUSCULAR; INTRAVENOUS at 23:23

## 2025-08-17 ASSESSMENT — ACTIVITIES OF DAILY LIVING (ADL): ADLS_ACUITY_SCORE: 49

## 2025-08-17 ASSESSMENT — COLUMBIA-SUICIDE SEVERITY RATING SCALE - C-SSRS
2. HAVE YOU ACTUALLY HAD ANY THOUGHTS OF KILLING YOURSELF IN THE PAST MONTH?: NO
6. HAVE YOU EVER DONE ANYTHING, STARTED TO DO ANYTHING, OR PREPARED TO DO ANYTHING TO END YOUR LIFE?: NO
1. IN THE PAST MONTH, HAVE YOU WISHED YOU WERE DEAD OR WISHED YOU COULD GO TO SLEEP AND NOT WAKE UP?: NO

## 2025-08-18 VITALS
HEIGHT: 63 IN | OXYGEN SATURATION: 97 % | DIASTOLIC BLOOD PRESSURE: 84 MMHG | BODY MASS INDEX: 39.34 KG/M2 | SYSTOLIC BLOOD PRESSURE: 168 MMHG | WEIGHT: 222 LBS | RESPIRATION RATE: 17 BRPM | TEMPERATURE: 98 F | HEART RATE: 80 BPM

## 2025-08-18 LAB — BACTERIA UR CULT: NORMAL

## 2025-08-18 RX ORDER — NITROFURANTOIN 25; 75 MG/1; MG/1
100 CAPSULE ORAL 2 TIMES DAILY
Qty: 14 CAPSULE | Refills: 0 | Status: SHIPPED | OUTPATIENT
Start: 2025-08-18

## (undated) DEVICE — CATH ANGIO INFINITI JL3.5 4FRX100CM 538418

## (undated) DEVICE — CUSTOM PACK CORONARY SAN5BCRHEA

## (undated) DEVICE — KIT HAND CONTROL ACIST 014644 AR-P54

## (undated) DEVICE — SLEEVE TR BAND RADIAL COMPRESSION DEVICE 29CM XX-RF06L

## (undated) DEVICE — SHEATH GLIDE RADIAL 4FR 25CM 0.021

## (undated) DEVICE — CATH DIAG 4FR JR 5.0 538423

## (undated) DEVICE — EXCHANGE WIRE .035 260 STAR/JFC/035/260/ M001491681

## (undated) DEVICE — SYR ANGIOGRAPHY MULTIUSE KIT ACIST 014612

## (undated) DEVICE — MANIFOLD KIT ANGIO AUTOMATED 014613

## (undated) DEVICE — GUIDEWIRE VASCULAR 0.035IN DIA 145CML 15CML/6CML STAINLESS

## (undated) DEVICE — INTRO MICRO MINI STICK 4FR STD NITINOL

## (undated) DEVICE — ELECTRODE ADULT PACING MULTI P-211-M1

## (undated) DEVICE — SYSTEM PANNUS RETENTION 4 PAD 2 STRAP CZ-PRS-04

## (undated) RX ORDER — FENTANYL CITRATE 50 UG/ML
INJECTION, SOLUTION INTRAMUSCULAR; INTRAVENOUS
Status: DISPENSED
Start: 2021-10-17